# Patient Record
Sex: FEMALE | Race: WHITE | NOT HISPANIC OR LATINO | URBAN - METROPOLITAN AREA
[De-identification: names, ages, dates, MRNs, and addresses within clinical notes are randomized per-mention and may not be internally consistent; named-entity substitution may affect disease eponyms.]

---

## 2017-05-09 ENCOUNTER — INPATIENT (INPATIENT)
Facility: HOSPITAL | Age: 72
LOS: 9 days | Discharge: NURSING, REHAB OR LONG-TERM CARE | End: 2017-05-19
Attending: INTERNAL MEDICINE | Admitting: INTERNAL MEDICINE
Payer: MEDICARE

## 2017-05-09 VITALS
HEART RATE: 87 BPM | TEMPERATURE: 100 F | OXYGEN SATURATION: 96 % | RESPIRATION RATE: 20 BRPM | HEIGHT: 64 IN | SYSTOLIC BLOOD PRESSURE: 161 MMHG | WEIGHT: 149.91 LBS | DIASTOLIC BLOOD PRESSURE: 79 MMHG

## 2017-05-09 DIAGNOSIS — N18.6 END STAGE RENAL DISEASE: ICD-10-CM

## 2017-05-09 DIAGNOSIS — Z90.722 ACQUIRED ABSENCE OF OVARIES, BILATERAL: Chronic | ICD-10-CM

## 2017-05-09 DIAGNOSIS — Z89.511 ACQUIRED ABSENCE OF RIGHT LEG BELOW KNEE: Chronic | ICD-10-CM

## 2017-05-09 DIAGNOSIS — N19 UNSPECIFIED KIDNEY FAILURE: ICD-10-CM

## 2017-05-09 DIAGNOSIS — T82.868A THROMBOSIS DUE TO VASCULAR PROSTHETIC DEVICES, IMPLANTS AND GRAFTS, INITIAL ENCOUNTER: ICD-10-CM

## 2017-05-09 DIAGNOSIS — E11.9 TYPE 2 DIABETES MELLITUS WITHOUT COMPLICATIONS: ICD-10-CM

## 2017-05-09 DIAGNOSIS — Z71.89 OTHER SPECIFIED COUNSELING: ICD-10-CM

## 2017-05-09 DIAGNOSIS — I25.10 ATHEROSCLEROTIC HEART DISEASE OF NATIVE CORONARY ARTERY WITHOUT ANGINA PECTORIS: ICD-10-CM

## 2017-05-09 DIAGNOSIS — A41.9 SEPSIS, UNSPECIFIED ORGANISM: ICD-10-CM

## 2017-05-09 DIAGNOSIS — Z95.1 PRESENCE OF AORTOCORONARY BYPASS GRAFT: Chronic | ICD-10-CM

## 2017-05-09 DIAGNOSIS — Z29.9 ENCOUNTER FOR PROPHYLACTIC MEASURES, UNSPECIFIED: ICD-10-CM

## 2017-05-09 LAB
ABO RH CONFIRMATION: SIGNIFICANT CHANGE UP
ALBUMIN SERPL ELPH-MCNC: 3.2 G/DL — LOW (ref 3.3–5)
ALP SERPL-CCNC: 184 U/L — HIGH (ref 40–120)
ALT FLD-CCNC: 11 U/L — LOW (ref 12–78)
ANION GAP SERPL CALC-SCNC: 12 MMOL/L — SIGNIFICANT CHANGE UP (ref 5–17)
APPEARANCE UR: (no result)
APTT BLD: 33.5 SEC — SIGNIFICANT CHANGE UP (ref 27.5–37.4)
AST SERPL-CCNC: 20 U/L — SIGNIFICANT CHANGE UP (ref 15–37)
BACTERIA # UR AUTO: (no result)
BASOPHILS # BLD AUTO: 0.1 K/UL — SIGNIFICANT CHANGE UP (ref 0–0.2)
BASOPHILS NFR BLD AUTO: 0.9 % — SIGNIFICANT CHANGE UP (ref 0–2)
BILIRUB SERPL-MCNC: 0.4 MG/DL — SIGNIFICANT CHANGE UP (ref 0.2–1.2)
BILIRUB UR-MCNC: (no result)
BLD GP AB SCN SERPL QL: SIGNIFICANT CHANGE UP
BUN SERPL-MCNC: 78 MG/DL — HIGH (ref 7–23)
CALCIUM SERPL-MCNC: 8.6 MG/DL — SIGNIFICANT CHANGE UP (ref 8.5–10.1)
CALCIUM SERPL-MCNC: 9.5 MG/DL — SIGNIFICANT CHANGE UP (ref 8.4–10.5)
CHLORIDE SERPL-SCNC: 97 MMOL/L — SIGNIFICANT CHANGE UP (ref 96–108)
CO2 SERPL-SCNC: 26 MMOL/L — SIGNIFICANT CHANGE UP (ref 22–31)
COLOR SPEC: YELLOW — SIGNIFICANT CHANGE UP
COMMENT - URINE: SIGNIFICANT CHANGE UP
COMMENT - URINE: SIGNIFICANT CHANGE UP
CREAT SERPL-MCNC: 6.05 MG/DL — HIGH (ref 0.5–1.3)
DIFF PNL FLD: (no result)
EOSINOPHIL # BLD AUTO: 0.6 K/UL — HIGH (ref 0–0.5)
EOSINOPHIL NFR BLD AUTO: 5.6 % — SIGNIFICANT CHANGE UP (ref 0–6)
EPI CELLS # UR: (no result)
GLUCOSE SERPL-MCNC: 125 MG/DL — HIGH (ref 70–99)
GLUCOSE UR QL: NEGATIVE MG/DL — SIGNIFICANT CHANGE UP
HCT VFR BLD CALC: 34 % — LOW (ref 34.5–45)
HGB BLD-MCNC: 10.9 G/DL — LOW (ref 11.5–15.5)
INR BLD: 1.08 RATIO — SIGNIFICANT CHANGE UP (ref 0.88–1.16)
KETONES UR-MCNC: NEGATIVE — SIGNIFICANT CHANGE UP
LACTATE SERPL-SCNC: 0.9 MMOL/L — SIGNIFICANT CHANGE UP (ref 0.7–2)
LEUKOCYTE ESTERASE UR-ACNC: (no result)
LYMPHOCYTES # BLD AUTO: 0.9 K/UL — LOW (ref 1–3.3)
LYMPHOCYTES # BLD AUTO: 9.5 % — LOW (ref 13–44)
MAGNESIUM SERPL-MCNC: 2.4 MG/DL — SIGNIFICANT CHANGE UP (ref 1.8–2.4)
MANUAL DIF COMMENT BLD-IMP: SIGNIFICANT CHANGE UP
MCHC RBC-ENTMCNC: 30.9 PG — SIGNIFICANT CHANGE UP (ref 27–34)
MCHC RBC-ENTMCNC: 32.1 GM/DL — SIGNIFICANT CHANGE UP (ref 32–36)
MCV RBC AUTO: 96.2 FL — SIGNIFICANT CHANGE UP (ref 80–100)
MONOCYTES # BLD AUTO: 0.9 K/UL — SIGNIFICANT CHANGE UP (ref 0–0.9)
MONOCYTES NFR BLD AUTO: 8.7 % — SIGNIFICANT CHANGE UP (ref 2–14)
NEUTROPHILS # BLD AUTO: 7.4 K/UL — SIGNIFICANT CHANGE UP (ref 1.8–7.4)
NEUTROPHILS NFR BLD AUTO: 75.3 % — SIGNIFICANT CHANGE UP (ref 43–77)
NITRITE UR-MCNC: NEGATIVE — SIGNIFICANT CHANGE UP
PH UR: 6 — SIGNIFICANT CHANGE UP (ref 5–8)
PLAT MORPH BLD: NORMAL — SIGNIFICANT CHANGE UP
PLATELET # BLD AUTO: 160 K/UL — SIGNIFICANT CHANGE UP (ref 150–400)
POTASSIUM SERPL-MCNC: 5.7 MMOL/L — HIGH (ref 3.5–5.3)
POTASSIUM SERPL-SCNC: 5.7 MMOL/L — HIGH (ref 3.5–5.3)
PROT SERPL-MCNC: 6.7 GM/DL — SIGNIFICANT CHANGE UP (ref 6–8.3)
PROT UR-MCNC: 500 MG/DL
PROTHROM AB SERPL-ACNC: 11.7 SEC — SIGNIFICANT CHANGE UP (ref 9.8–12.7)
PTH-INTACT FLD-MCNC: 195 PG/ML — HIGH (ref 15–65)
RBC # BLD: 3.53 M/UL — LOW (ref 3.8–5.2)
RBC # FLD: 15.8 % — HIGH (ref 10.3–14.5)
RBC BLD AUTO: NORMAL — SIGNIFICANT CHANGE UP
RBC CASTS # UR COMP ASSIST: >50 /HPF (ref 0–4)
SODIUM SERPL-SCNC: 135 MMOL/L — SIGNIFICANT CHANGE UP (ref 135–145)
SP GR SPEC: 1.01 — SIGNIFICANT CHANGE UP (ref 1.01–1.02)
TYPE + AB SCN PNL BLD: SIGNIFICANT CHANGE UP
UROBILINOGEN FLD QL: NEGATIVE MG/DL — SIGNIFICANT CHANGE UP
WBC # BLD: 9.8 K/UL — SIGNIFICANT CHANGE UP (ref 3.8–10.5)
WBC # FLD AUTO: 9.8 K/UL — SIGNIFICANT CHANGE UP (ref 3.8–10.5)
WBC UR QL: (no result)

## 2017-05-09 PROCEDURE — 71010: CPT | Mod: 26

## 2017-05-09 PROCEDURE — 93010 ELECTROCARDIOGRAM REPORT: CPT

## 2017-05-09 PROCEDURE — 99283 EMERGENCY DEPT VISIT LOW MDM: CPT

## 2017-05-09 RX ORDER — SEVELAMER CARBONATE 2400 MG/1
1 POWDER, FOR SUSPENSION ORAL
Qty: 0 | Refills: 0 | COMMUNITY

## 2017-05-09 RX ORDER — FLUOROMETHOLONE 1 MG/ML
1 SOLUTION/ DROPS OPHTHALMIC
Qty: 0 | Refills: 0 | Status: DISCONTINUED | OUTPATIENT
Start: 2017-05-09 | End: 2017-05-19

## 2017-05-09 RX ORDER — CHOLECALCIFEROL (VITAMIN D3) 125 MCG
1 CAPSULE ORAL
Qty: 0 | Refills: 0 | COMMUNITY

## 2017-05-09 RX ORDER — CEFEPIME 1 G/1
1000 INJECTION, POWDER, FOR SOLUTION INTRAMUSCULAR; INTRAVENOUS DAILY
Qty: 0 | Refills: 0 | Status: COMPLETED | OUTPATIENT
Start: 2017-05-10 | End: 2017-05-10

## 2017-05-09 RX ORDER — ATORVASTATIN CALCIUM 80 MG/1
10 TABLET, FILM COATED ORAL AT BEDTIME
Qty: 0 | Refills: 0 | Status: DISCONTINUED | OUTPATIENT
Start: 2017-05-09 | End: 2017-05-19

## 2017-05-09 RX ORDER — ACETAMINOPHEN 500 MG
2 TABLET ORAL
Qty: 0 | Refills: 0 | COMMUNITY

## 2017-05-09 RX ORDER — ACETAMINOPHEN 500 MG
650 TABLET ORAL EVERY 6 HOURS
Qty: 0 | Refills: 0 | Status: DISCONTINUED | OUTPATIENT
Start: 2017-05-09 | End: 2017-05-19

## 2017-05-09 RX ORDER — METOCLOPRAMIDE HCL 10 MG
1 TABLET ORAL
Qty: 0 | Refills: 0 | COMMUNITY

## 2017-05-09 RX ORDER — SENNA PLUS 8.6 MG/1
1 TABLET ORAL
Qty: 0 | Refills: 0 | COMMUNITY

## 2017-05-09 RX ORDER — CARVEDILOL PHOSPHATE 80 MG/1
25 CAPSULE, EXTENDED RELEASE ORAL EVERY 12 HOURS
Qty: 0 | Refills: 0 | Status: DISCONTINUED | OUTPATIENT
Start: 2017-05-09 | End: 2017-05-11

## 2017-05-09 RX ORDER — AMLODIPINE BESYLATE 2.5 MG/1
5 TABLET ORAL DAILY
Qty: 0 | Refills: 0 | Status: DISCONTINUED | OUTPATIENT
Start: 2017-05-10 | End: 2017-05-11

## 2017-05-09 RX ORDER — ISOSORBIDE MONONITRATE 60 MG/1
60 TABLET, EXTENDED RELEASE ORAL DAILY
Qty: 0 | Refills: 0 | Status: DISCONTINUED | OUTPATIENT
Start: 2017-05-10 | End: 2017-05-19

## 2017-05-09 RX ORDER — ASPIRIN/CALCIUM CARB/MAGNESIUM 324 MG
81 TABLET ORAL DAILY
Qty: 0 | Refills: 0 | Status: DISCONTINUED | OUTPATIENT
Start: 2017-05-09 | End: 2017-05-19

## 2017-05-09 RX ORDER — DOCUSATE SODIUM 100 MG
100 CAPSULE ORAL THREE TIMES A DAY
Qty: 0 | Refills: 0 | Status: DISCONTINUED | OUTPATIENT
Start: 2017-05-09 | End: 2017-05-19

## 2017-05-09 RX ORDER — LACTULOSE 10 G/15ML
30 SOLUTION ORAL
Qty: 0 | Refills: 0 | COMMUNITY

## 2017-05-09 RX ORDER — CEFTRIAXONE 500 MG/1
1 INJECTION, POWDER, FOR SOLUTION INTRAMUSCULAR; INTRAVENOUS ONCE
Qty: 0 | Refills: 0 | Status: COMPLETED | OUTPATIENT
Start: 2017-05-09 | End: 2017-05-09

## 2017-05-09 RX ORDER — METOCLOPRAMIDE HCL 10 MG
10 TABLET ORAL ONCE
Qty: 0 | Refills: 0 | Status: DISCONTINUED | OUTPATIENT
Start: 2017-05-09 | End: 2017-05-19

## 2017-05-09 RX ORDER — SODIUM CHLORIDE 9 MG/ML
1000 INJECTION INTRAMUSCULAR; INTRAVENOUS; SUBCUTANEOUS ONCE
Qty: 0 | Refills: 0 | Status: COMPLETED | OUTPATIENT
Start: 2017-05-09 | End: 2017-05-09

## 2017-05-09 RX ORDER — SERTRALINE 25 MG/1
50 TABLET, FILM COATED ORAL DAILY
Qty: 0 | Refills: 0 | Status: DISCONTINUED | OUTPATIENT
Start: 2017-05-10 | End: 2017-05-19

## 2017-05-09 RX ORDER — PIPERACILLIN AND TAZOBACTAM 4; .5 G/20ML; G/20ML
3.38 INJECTION, POWDER, LYOPHILIZED, FOR SOLUTION INTRAVENOUS ONCE
Qty: 0 | Refills: 0 | Status: COMPLETED | OUTPATIENT
Start: 2017-05-09 | End: 2017-05-09

## 2017-05-09 RX ORDER — GABAPENTIN 400 MG/1
300 CAPSULE ORAL AT BEDTIME
Qty: 0 | Refills: 0 | Status: DISCONTINUED | OUTPATIENT
Start: 2017-05-10 | End: 2017-05-19

## 2017-05-09 RX ORDER — HEPARIN SODIUM 5000 [USP'U]/ML
5000 INJECTION INTRAVENOUS; SUBCUTANEOUS EVERY 8 HOURS
Qty: 0 | Refills: 0 | Status: DISCONTINUED | OUTPATIENT
Start: 2017-05-09 | End: 2017-05-19

## 2017-05-09 RX ORDER — VANCOMYCIN HCL 1 G
1000 VIAL (EA) INTRAVENOUS ONCE
Qty: 0 | Refills: 0 | Status: COMPLETED | OUTPATIENT
Start: 2017-05-09 | End: 2017-05-09

## 2017-05-09 RX ORDER — SENNA PLUS 8.6 MG/1
2 TABLET ORAL AT BEDTIME
Qty: 0 | Refills: 0 | Status: DISCONTINUED | OUTPATIENT
Start: 2017-05-09 | End: 2017-05-19

## 2017-05-09 RX ORDER — DOXERCALCIFEROL 2.5 UG/1
1 CAPSULE ORAL
Qty: 0 | Refills: 0 | Status: DISCONTINUED | OUTPATIENT
Start: 2017-05-09 | End: 2017-05-09

## 2017-05-09 RX ADMIN — CEFTRIAXONE 100 GRAM(S): 500 INJECTION, POWDER, FOR SOLUTION INTRAMUSCULAR; INTRAVENOUS at 13:45

## 2017-05-09 RX ADMIN — Medication 250 MILLIGRAM(S): at 14:00

## 2017-05-09 RX ADMIN — SODIUM CHLORIDE 1000 MILLILITER(S): 9 INJECTION INTRAMUSCULAR; INTRAVENOUS; SUBCUTANEOUS at 12:35

## 2017-05-09 RX ADMIN — PIPERACILLIN AND TAZOBACTAM 200 GRAM(S): 4; .5 INJECTION, POWDER, LYOPHILIZED, FOR SOLUTION INTRAVENOUS at 13:30

## 2017-05-09 RX ADMIN — HEPARIN SODIUM 5000 UNIT(S): 5000 INJECTION INTRAVENOUS; SUBCUTANEOUS at 22:04

## 2017-05-09 NOTE — H&P ADULT - PMH
AICD (automatic cardioverter/defibrillator) present    AVF (arteriovenous fistula)  Left  CAD (coronary artery disease)    Cervical carcinoma    CVA (cerebral vascular accident)    DM2 (diabetes mellitus, type 2)    ESRD on hemodialysis    MI (myocardial infarction)

## 2017-05-09 NOTE — CONSULT NOTE ADULT - ASSESSMENT
A/P 71 Female who is presenting with a LUE thrombosis, possible Fistula infection, and uremic    Plan:  Will Place a Right Fem Shiley  Fem Vein open on US  Not on Anticoagulants  Coags in normal range as well as platelets  Consent obtained from the patients daughter  Must be removed with in 3 Days

## 2017-05-09 NOTE — ED PROVIDER NOTE - MUSCULOSKELETAL, MLM
Spine appears normal, range of motion is not limited, no muscle or joint tenderness. LUE fistula with palpable thrill, surround edema, erythema.

## 2017-05-09 NOTE — CONSULT NOTE ADULT - SUBJECTIVE AND OBJECTIVE BOX
NEPHROLOGY CONSULT    71 yr old f h/o cardiac tumor s/p removal , CABG x 2 vessels, HD x 3 yrs, PVD, R bka 2016, AICD/PM, x 4 yrs, MI, CVA, DM.  Pt has L UE avf, same side of AICD, with clotted UE bilaterally.. Pt recently seen by her surgeon Dr awadallah who dx her w bilat UE clots, she was also seen at [facility] yesterday, I spoke to Dr hearn.. He stated he could not get a through the clots adjacent to the PM wire to de clot the vein. he suggested ligating the avf may reduce the swelling..  Today arm is erythematous and pt is febrile, lethargic. IV abx  given in ER              FAMILY HISTORY:      MEDICATIONS  (STANDING):  doxercalciferol Injectable 1MICROGram(s) IV Push <User Schedule>    MEDICATIONS  (PRN):      Allergies    No Known Allergies    Intolerances        I&O's Summary        REVIEW OF SYSTEMS:    CONSTITUTIONAL:  As per HPI.    HEENT:  Eyes:  No diplopia or blurred vision. ENT:  No earache, sore throat or runny nose.    CARDIOVASCULAR:  No pressure, squeezing, strangling, tightness, heaviness or aching about the chest, neck, axilla or epigastrium.    RESPIRATORY:  No cough, shortness of breath, PND or orthopnea.    GASTROINTESTINAL:  No nausea, vomiting or diarrhea.    GENITOURINARY:  No dysuria, frequency or urgency.    MUSCULOSKELETAL:  As per HPI.    SKIN:  No change in skin, hair or nails.    NEUROLOGIC:  No paresthesias, fasciculations, seizures or weakness.    PSYCHIATRIC:  No disorder of thought or mood.    ENDOCRINE:  No heat or cold intolerance, polyuria or polydipsia.    HEMATOLOGICAL:  No easy bruising or bleeding.    Patient was seen and evaluated on dialysis.   Patient is tolerating the procedure well.   Continue dialysis:   Dialyzer:          QB:        QD:   Goal UF ___ over ___ Hours       Vital Signs Last 24 Hrs  T(C): 38.3, Max: 38.3 (05-09 @ 12:30)  T(F): 101, Max: 101 (05-09 @ 12:30)  HR: 77 (77 - 87)  BP: 155/55 (152/59 - 167/60)  BP(mean): --  RR: 16 (14 - 20)  SpO2: 96% (94% - 100%)  Daily Height in cm: 162.56 (09 May 2017 12:24)    Daily   I&O's Summary      PHYSICAL EXAM:    General:  Alert, well-developed ,No acute distress.    Neuro:  Alert and oriented to person, place, and time. Able to communicate  well. Cranial nerves 2-12 grossly intact. 5/5 strength in all  extremities bilaterally. Sensation intact in all extremities.  Appropriate affect.     HEENT:  No JVD, no masses, Eyes anicteric, No carotid bruits.No lymphadenopathy,    Cardiovascular:  Regular rate and rhythm, with normal S1 and S2. No murmurs, rubs,  or gallops. No JVD.     Lungs:  clear. no rales, no wheezing, .    Abdomen:  Normoactive bowel sounds. Soft, flat, non-tender, and non-distended.  No hepatosplenomegaly, positive bowel sounds    Skin:  Warm, dry, well-perfused. No rashes or other lesions.     Extremities:  2+ pulses in upper and lower extremities. No lower extremity pain or  edema; legs are symmetric in appearance.    LABS:                        10.9   9.8   )-----------( 160      ( 09 May 2017 12:53 )             34.0         135  |  97  |  78<H>  ----------------------------<  125<H>  5.7<H>   |  26  |  6.05<H>    Ca    8.6      09 May 2017 12:53  Mg     2.4         TPro  6.7  /  Alb  3.2<L>  /  TBili  0.4  /  DBili  x   /  AST  20  /  ALT  11<L>  /  AlkPhos  184<H>      PT/INR - ( 09 May 2017 12:53 )   PT: 11.7 sec;   INR: 1.08 ratio         PTT - ( 09 May 2017 12:53 )  PTT:33.5 sec  Urinalysis Basic - ( 09 May 2017 12:50 )    Color: Yellow / Appearance: very cloudy / S.010 / pH: x  Gluc: x / Ketone: Negative  / Bili: Small / Urobili: Negative mg/dL   Blood: x / Protein: 500 mg/dL / Nitrite: Negative   Leuk Esterase: Moderate / RBC: >50 /HPF / WBC 11-25   Sq Epi: x / Non Sq Epi: Moderate / Bacteria: Moderate      Magnesium, Serum: 2.4 mg/dL ( @ 12:53) NEPHROLOGY CONSULT    71 yr old f h/o cardiac tumor s/p removal , CABG x 2 vessels, HD x 3 yrs, PVD, R bka 2016, AICD/PM, x 4 yrs, MI, CVA, DM.  Pt has L UE avf, same side of AICD, with clotted UE bilaterally.. Pt recently seen by her surgeon Dr awadallah who dx her w bilat UE clots, she was also seen at [facility] yesterday, I spoke to Dr hearn.. He stated he could not get a through the clots adjacent to the PM wire to de clot the vein. he suggested ligating the avf may reduce the swelling..  Today arm is erythematous and pt is febrile, lethargic. IV abx  given in ER ( zosyn, rocephin, vanco)    PSH  heart surgery for clot removal  CABG  R BKA  AV access  bilat oophorectomy and NANCY for cervical CA 1970s    FAMILY HISTORY:  Not contributory    Social:   2 daughters  ex smoker  no alcohol      MEDICATIONS  (STANDING):  Cefepime 1 g IVPB qd          Allergies    No Known Allergies    Intolerances        I&O's Summary        REVIEW OF SYSTEMS:    CONSTITUTIONAL:  As per HPI.    Lethargic due to sepsis      Vital Signs Last 24 Hrs  T(C): 38.3, Max: 38.3 (-09 @ 12:30)  T(F): 101, Max: 101 (05-09 @ 12:30)  HR: 77 (77 - 87)  BP: 155/55 (152/59 - 167/60)  BP(mean): --  RR: 16 (14 - 20)  SpO2: 96% (94% - 100%)  Daily Height in cm: 162.56 (09 May 2017 12:24)    Daily   I&O's Summary      PHYSICAL EXAM:    General:  Alert, well-developed ,No acute distress.    Neuro:  lethargic    HEENT:  No JVD, no masses, Eyes anicteric, No carotid bruits.No lymphadenopathy,    Cardiovascular:  RR 2/6 m, no rubs    Lungs:  clear. no rales, no wheezing, .    Abdomen:  Normoactive bowel sounds. Soft, flat, non-tender, and non-distended.  No hepatosplenomegaly, positive bowel sounds    Skin:  Warm, dry, well-perfused. No rashes or other lesions. Left arm, skin erythematous    Extremities:  R bka, Left arm swollen, erythematous    LABS:                        10.9   9.8   )-----------( 160      ( 09 May 2017 12:53 )             34.0         135  |  97  |  78<H>  ----------------------------<  125<H>  5.7<H>   |  26  |  6.05<H>    Ca    8.6      09 May 2017 12:53  Mg     2.4         TPro  6.7  /  Alb  3.2<L>  /  TBili  0.4  /  DBili  x   /  AST  20  /  ALT  11<L>  /  AlkPhos  184<H>      PT/INR - ( 09 May 2017 12:53 )   PT: 11.7 sec;   INR: 1.08 ratio         PTT - ( 09 May 2017 12:53 )  PTT:33.5 sec  Urinalysis Basic - ( 09 May 2017 12:50 )    Color: Yellow / Appearance: very cloudy / S.010 / pH: x  Gluc: x / Ketone: Negative  / Bili: Small / Urobili: Negative mg/dL   Blood: x / Protein: 500 mg/dL / Nitrite: Negative   Leuk Esterase: Moderate / RBC: >50 /HPF / WBC 11-25   Sq Epi: x / Non Sq Epi: Moderate / Bacteria: Moderate      Magnesium, Serum: 2.4 mg/dL ( @ 12:53)

## 2017-05-09 NOTE — H&P ADULT - PROBLEM SELECTOR PLAN 3
~plan is for emergent dialysis in the CCU with plans for possible repeat session in the  am.  ~f/u w/ Renal  ~cont. Renvela when tolerated

## 2017-05-09 NOTE — H&P ADULT - PROBLEM SELECTOR PLAN 6
~Vte ppx; cont. Heparin sq ~Per patients' daughter and documentation review from [facility] the patient is DNR. The patients' HCP is the patients'  Darrel Aguilar. Please confirm in the am as efforts to contact him were unsuccessful.  ~f/u w/ Palliative care

## 2017-05-09 NOTE — ED PROVIDER NOTE - OBJECTIVE STATEMENT
70 y/o F with a PMHx of R BKA, CKD on dialysis presents to the ED c/o fever, AMS, inability to access dialysis fistula as per EMS who brought her in from [facility] where she was being dialyzed. Pt here to be admitted due to dialysis complications. Pt currently somnolent, tired appearing, able to open eyes upon command, but unable to give adequate hx or ROS at this time. (Code Sepsis) 72 y/o F with a PMHx of R BKA, CKD on dialysis presents to the ED c/o fever, AMS, inability to access dialysis fistula as per EMS who brought her in from [facility] where she was being dialyzed. Pt here to be admitted due to dialysis complications. Pt currently somnolent, tired appearing, able to open eyes upon command, but unable to give adequate hx or ROS at this time. (Code Sepsis) 70 y/o F with a PMHx of R BKA, LUE DVT, CKD on dialysis presents to the ED c/o fever, AMS, inability to access dialysis fistula as per EMS who brought her in from [facility] where she was being dialyzed. Pt here to be admitted due to dialysis complications. Pt currently somnolent, tired appearing, able to open eyes upon command, but unable to give adequate hx or ROS at this time.

## 2017-05-09 NOTE — H&P ADULT - PROBLEM SELECTOR PLAN 1
~due to likely infected Lt AVF with surrounding Cellulitis  ~f/u PAN C+S  ~cont. Cefepime 1g IV on dialysis days  ~f/u w/ ID in the am  ~patient admitted to CCU  ~Vancomycin given

## 2017-05-09 NOTE — CONSULT NOTE ADULT - CONSULT REASON
ESRD sepsis, clotted access  71 yr old f h/o cardiac tumor s/p removal 1990s, CABG x 2 vessels, HD x 3 yrs, PVD, R bka 9/2016, AICD/PM, x 4 yrs, MI, CVA, DM.  Pt has L UE avf, same side of AICD, with clotted UE bilaterally.. Pt recently seen by her surgeon Dr awadallah who dx her w bilat UE clots, she was also seen ESRD sepsis, clotted access

## 2017-05-09 NOTE — CONSULT NOTE ADULT - SUBJECTIVE AND OBJECTIVE BOX
Pt is a 70 y/o F with pmhx of cardiac tumor s/p removal , CABG x 2 vessels, HD for the past 3 years, PVD, R bka 2016, AICD/PM 4 years ago, MI, CVA, DM.  Called to ED to see pt by Dr. Leal for need for access for emergent dialysis as pt previously has LUE avf, same side of AICD, which has occluded with thrombus. Pt fpund to have b/l thrombus and has not had access for dialysis. Pt has not been dialized since for the past 4 days. Pt recently seen by her surgeon Dr Awadallah who dx her w bilat UE clots, she was also seen at [facility] yesterday, who spoke with Dr. Leal and noted that he could not get a through the clots adjacent to the PM wire to de clot the vein. he suggested ligating the avf which may reduce the swelling. Today pt arrived from [facility] with fever, erythematous arm, and lethargic. In ED pt was started on zpsyn, rocephin and vanco. Potassium noted to be 5.7. Pt obtunded.      D/W pts daughter, will place femoral line and dialyze today  ID consult, Dr Carrasco informed, suggested to place on Cefepime 1 g iv daily starting in am, after hd on hd days..  Dr Morin consulted for second opinion of av access..  lactate level w HD      ICU Vital Signs Last 24 Hrs  T(C): 36.7, Max: 38.3 (05-09 @ 12:30)  T(F): 98.1, Max: 101 (05-09 @ 12:30)  HR: 75 (74 - 87)  BP: 161/50 (152/59 - 167/60)  BP(mean): 80 (80 - 80)  ABP: --  ABP(mean): --  RR: 19 (14 - 20)  SpO2: 93% (93% - 100%)    T(C): 36.7, Max: 38.3 (05-09 @ 12:30)  HR: 75 (74 - 87)  BP: 161/50 (152/59 - 167/60)  RR: 19 (14 - 20)  SpO2: 93% (93% - 100%)  Wt(kg): --    PE:  Gen: Obtunded lethargic but still arousabile   CV: s1s2 rrr  Lungs: CTA b/l  Abd: soft NTD  Neck Right sided scar s/p enderactomy  Ext LUE erythematous, edematous with calor , right BKA 1+ edema                              10.9   9.8   )-----------( 160      ( 09 May 2017 12:53 )             34.0     09 May 2017 12:53    135    |  97     |  78     ----------------------------<  125    5.7     |  26     |  6.05     Ca    8.6        09 May 2017 12:53  Mg     2.4       09 May 2017 12:53    TPro  6.7    /  Alb  3.2    /  TBili  0.4    /  DBili  x      /  AST  20     /  ALT  11     /  AlkPhos  184    09 May 2017 12:53    PT/INR - ( 09 May 2017 12:53 )   PT: 11.7 sec;   INR: 1.08 ratio         PTT - ( 09 May 2017 12:53 )  PTT:33.5 sec  CAPILLARY BLOOD GLUCOSE    LIVER FUNCTIONS - ( 09 May 2017 12:53 )  Alb: 3.2 g/dL / Pro: 6.7 gm/dL / ALK PHOS: 184 U/L / ALT: 11 U/L / AST: 20 U/L / GGT: x           Urinalysis Basic - ( 09 May 2017 12:50 )    Color: Yellow / Appearance: very cloudy / S.010 / pH: x  Gluc: x / Ketone: Negative  / Bili: Small / Urobili: Negative mg/dL   Blood: x / Protein: 500 mg/dL / Nitrite: Negative   Leuk Esterase: Moderate / RBC: >50 /HPF / WBC 11-25   Sq Epi: x / Non Sq Epi: Moderate / Bacteria: Moderate      CXR IMPRESSION:     There is right perihilar prominence and pulmonary vascular engorgement   suggestive of interstitial edema. There is probable small left effusion.   There is cardiomegaly. Status post sternotomy. Pacemaker/ICD tip in   region of right ventricle.           A&P Pt is a 70 y/o F with pmhx of cardiac tumor s/p removal , CABG x 2 vessels, HD for the past 3 years, PVD, R bka 2016, AICD/PM 4 years ago, MI, CVA, DM. Admitted for emergent dialysis s/p b/l UE thrombus limiting access for dialysis.     Will place Femoral shiley catheter and subsequent emergent dialysis with Dr. Leal  Will monitor and will be admitted under hospitalist service s/p dialysis.  Shiley temporary measure to be removed by day 3 at the latest. Pt will need new access.    Pt with possible line sepsis due to previous line As per ID pt to start on Cefepime 1 G daily to be given with after dialysis on HD days.  Dr. Morni consult in for 2nd opinion on access.     D/W Dr. Alas.

## 2017-05-09 NOTE — INPATIENT CERTIFICATION FOR MEDICARE PATIENTS - RISKS OF ADVERSE EVENTS
Concern for worsening infectious process/Concern for renal deterioration/Concern for delay in diagnosis and treatment

## 2017-05-09 NOTE — H&P ADULT - ASSESSMENT
72 y/o F PMHx significant for severe PVD s/p Rt BKA (9/2016), LUE DVT, cardiac tumor s/p removal 1990s, CABG x 2 vessels, ESRD on HD x 3 yrs, AICD/PM, CAD, prior MI, CVA, DM2 who was BIBA from her dialysis center where an attempt to dialyze via LUE AVF was unsuccessful as she it was found to be clotted. The patient was referred to the ED for further management as the patients' last dialysis was this past Saturday. Per Renal the patient was recently evaluated by Dr. Awadallah who diagnosed her w/ B/L UE. The patient was evaluated at [facility] yesterday where they were unsuccessful. Today the patient was febrile (Tmax 101'F), and her left arm was increasingly erythematous. In the ED the patient was given Ceftriaxone 1g IV x 1, Zosyn 3.375g IV x 1, and Vancomycin 1g IV x 1. The patient was admitted to the CCU for emergent dialysis and shiley catheter placement. Renal was consulted in the ED. 70 y/o F PMHx significant for severe PVD s/p Rt BKA (9/2016), LUE DVT, cardiac tumor s/p removal 1990s, CABG x 2 vessels, ESRD on HD x 3 yrs, AICD/PM, CAD, prior MI, CVA, DM2 who was BIBA from [facility] where she was found to be increasingly lethargic, febrile, with left arm swelling and erythema. The patient was recently evaluated by her vascular surgeon Dr. Awadallah who diagnosed her w/ B/L UE thrombi (of note the patients' last HD session was on Saturday). The patient was evaluated yesterday at [facility]. Per Renal attempt at AVF salvage was unsuccessful. Today the patient was referred to the ED from [facility] as the patient was noted to be increasingly lethargic, febrile, and had left arm swelling and erythema. In the ED the patient was given Ceftriaxone 1g IV x 1, Zosyn 3.375g IV x 1, and Vancomycin 1g IV x 1. The patient was admitted to the CCU for emergent dialysis and shiley catheter placement. Renal, Vascular Surgery, and ID were consulted in the ED.

## 2017-05-09 NOTE — CONSULT NOTE ADULT - SUBJECTIVE AND OBJECTIVE BOX
Consult Note:   · Provider Specialty	Nephrology	    Referral/Consultation:   Initial Consult:  · Requested by Name:	Hospitalist	  · Date/Time:	09-May-2017 15:49	  · Reason for Referral/Consultation:	ESRD sepsis, clotted access	  	ESRD sepsis, clotted access 71 yr old f h/o cardiac tumor s/p removal , CABG x 2 vessels, HD x 3 yrs, PVD, R bka 2016, AICD/PM, x 4 yrs, MI, CVA, DM. Pt has L UE avf, same side of AICD, with clotted UE bilaterally.. Pt recently seen by her surgeon Dr awadallah who dx her w bilat UE clots, she was also seen	      · Subjective and Objective: 	  VASCULAR CONSULT    71 yr old f h/o cardiac tumor s/p removal , CABG x 2 vessels, HD x 3 yrs, PVD, R bka 2016, L AICD/PM, x 4 yrs, MI, CVA, DM.  Pt has L UE avf, same side of AICD, with clotted UE bilaterally.. Pt recently seen by her surgeon Dr Awadallah who dx her w bilat UE clots, she was also seen at [facility] yesterday, Dr White stated he could not get a through the clots adjacent to the pacing wire to de clot the vein and that ligating the avf may reduce the swelling..  Today arm is erythematous and pt is febrile, lethargic. IV abx  given in ER ( zosyn, rocephin, vanco)    [facility]  heart surgery for clot removal  CABG  R AKA  AV access  bilat oophorectomy and NANCY for cervical CA 1970s    FAMILY HISTORY:  Not contributory    Social:   2 daughters  ex smoker  no alcohol      MEDICATIONS  (STANDING):  Cefepime 1 g IVPB qd          Allergies    No Known Allergies    Intolerances        I&O's Summary        REVIEW OF SYSTEMS:    CONSTITUTIONAL:  As per HPI.    Lethargic due to sepsis      Vital Signs Last 24 Hrs  T(C): 38.3, Max: 38.3 (05-09 @ 12:30)  T(F): 101, Max: 101 (05- @ 12:30)  HR: 77 (77 - 87)  BP: 155/55 (152/59 - 167/60)  BP(mean): --  RR: 16 (14 - 20)  SpO2: 96% (94% - 100%)  Daily Height in cm: 162.56 (09 May 2017 12:24)    Daily   I&O's Summary      PHYSICAL EXAM:    General:  Alert, well-developed ,No acute distress.    Neuro:  lethargic    HEENT:  No JVD, no masses, Eyes anicteric, No carotid bruits.No lymphadenopathy,    Cardiovascular:  RR 2/6 m, no rubs    Lungs:  clear. no rales, no wheezing, PPM pocket L upper outer chest wall    Abdomen:  Normoactive bowel sounds. Soft, flat, non-tender, and non-distended.  No hepatosplenomegaly, positive bowel sounds    Skin:  Warm, dry, well-perfused. No rashes or other lesions. Left arm, skin erythematous    Extremities:  R  AKA, R femoral dialysis catheter in place  Left arm edematous, erythematous; audible bruit, pulsatile thrill in antecubital fossa; puncture site L access without gross purulence, L hand pink, warm and well perfused    LABS:                        10.9   9.8   )-----------( 160      ( 09 May 2017 12:53 )             34.0     05    135  |  97  |  78<H>  ----------------------------<  125<H>  5.7<H>   |  26  |  6.05<H>    Ca    8.6      09 May 2017 12:53  Mg     2.4         TPro  6.7  /  Alb  3.2<L>  /  TBili  0.4  /  DBili  x   /  AST  20  /  ALT  11<L>  /  AlkPhos  184<H>      PT/INR - ( 09 May 2017 12:53 )   PT: 11.7 sec;   INR: 1.08 ratio         PTT - ( 09 May 2017 12:53 )  PTT:33.5 sec  Urinalysis Basic - ( 09 May 2017 12:50 )    Color: Yellow / Appearance: very cloudy / S.010 / pH: x  Gluc: x / Ketone: Negative  / Bili: Small / Urobili: Negative mg/dL   Blood: x / Protein: 500 mg/dL / Nitrite: Negative   Leuk Esterase: Moderate / RBC: >50 /HPF / WBC 11-25   Sq Epi: x / Non Sq Epi: Moderate / Bacteria: Moderate      Magnesium, Serum: 2.4 mg/dL ( @ 12:53)        NEPHROLOGY CONSULT    71 yr old f h/o cardiac tumor s/p removal , CABG x 2 vessels, HD x 3 yrs, PVD, R bka 2016, AICD/PM, x 4 yrs, MI, CVA, DM.  Pt has L UE avf, same side of AICD, with clotted UE bilaterally.. Pt recently seen by her surgeon Dr awadallah who dx her w bilat UE clots, she was also seen at [facility] yesterday, I spoke to Dr white.. He stated he could not get a through the clots adjacent to the PM wire to de clot the vein. he suggested ligating the avf may reduce the swelling..  Today arm is erythematous and pt is febrile, lethargic. IV abx  given in ER              FAMILY HISTORY:      MEDICATIONS  (STANDING):  doxercalciferol Injectable 1MICROGram(s) IV Push <User Schedule>    MEDICATIONS  (PRN):      Allergies    No Known Allergies    Intolerances        I&O's Summary        REVIEW OF SYSTEMS:    CONSTITUTIONAL:  As per HPI.    HEENT:  Eyes:  No diplopia or blurred vision. ENT:  No earache, sore throat or runny nose.    CARDIOVASCULAR:  No pressure, squeezing, strangling, tightness, heaviness or aching about the chest, neck, axilla or epigastrium.    RESPIRATORY:  No cough, shortness of breath, PND or orthopnea.    GASTROINTESTINAL:  No nausea, vomiting or diarrhea.    GENITOURINARY:  No dysuria, frequency or urgency.    MUSCULOSKELETAL:  As per HPI.    SKIN:  No change in skin, hair or nails.    NEUROLOGIC:  No paresthesias, fasciculations, seizures or weakness.    PSYCHIATRIC:  No disorder of thought or mood.    ENDOCRINE:  No heat or cold intolerance, polyuria or polydipsia.    HEMATOLOGICAL:  No easy bruising or bleeding.    Patient was seen and evaluated on dialysis.   Patient is tolerating the procedure well.   Continue dialysis:   Dialyzer:          QB:        QD:   Goal UF ___ over ___ Hours       Vital Signs Last 24 Hrs  T(C): 38.3, Max: 38.3 (- @ 12:30)  T(F): 101, Max: 101 (- @ 12:30)  HR: 77 (77 - 87)  BP: 155/55 (152/59 - 167/60)  BP(mean): --  RR: 16 (14 - 20)  SpO2: 96% (94% - 100%)  Daily Height in cm: 162.56 (09 May 2017 12:24)    Daily   I&O's Summary      PHYSICAL EXAM:    General:  Alert, well-developed ,No acute distress.    Neuro:  Alert and oriented to person, place, and time. Able to communicate  well. Cranial nerves 2-12 grossly intact. 5/5 strength in all  extremities bilaterally. Sensation intact in all extremities.  Appropriate affect.     HEENT:  No JVD, no masses, Eyes anicteric, No carotid bruits.No lymphadenopathy,    Cardiovascular:  Regular rate and rhythm, with normal S1 and S2. No murmurs, rubs,  or gallops. No JVD.     Lungs:  clear. no rales, no wheezing, .    Abdomen:  Normoactive bowel sounds. Soft, flat, non-tender, and non-distended.  No hepatosplenomegaly, positive bowel sounds    Skin:  Warm, dry, well-perfused. No rashes or other lesions.     Extremities:  2+ pulses in upper and lower extremities. No lower extremity pain or  edema; legs are symmetric in appearance.    LABS:                        10.9   9.8   )-----------( 160      ( 09 May 2017 12:53 )             34.0         135  |  97  |  78<H>  ----------------------------<  125<H>  5.7<H>   |  26  |  6.05<H>    Ca    8.6      09 May 2017 12:53  Mg     2.4         TPro  6.7  /  Alb  3.2<L>  /  TBili  0.4  /  DBili  x   /  AST  20  /  ALT  11<L>  /  AlkPhos  184<H>      PT/INR - ( 09 May 2017 12:53 )   PT: 11.7 sec;   INR: 1.08 ratio         PTT - ( 09 May 2017 12:53 )  PTT:33.5 sec  Urinalysis Basic - ( 09 May 2017 12:50 )    Color: Yellow / Appearance: very cloudy / S.010 / pH: x  Gluc: x / Ketone: Negative  / Bili: Small / Urobili: Negative mg/dL   Blood: x / Protein: 500 mg/dL / Nitrite: Negative   Leuk Esterase: Moderate / RBC: >50 /HPF / WBC 11-25   Sq Epi: x / Non Sq Epi: Moderate / Bacteria: Moderate      Magnesium, Serum: 2.4 mg/dL ( @ 12:53)    Assessment and Recommendation:   · Assessment		  71 yr old f h/o cardiac tumor s/p removal , CABG x 2 vessels, HD x 3 yrs, PVD, R bka 2016, AICD/PM, x 4 yrs, MI, CVA, DM.  Pt has L UE avf, same side of AICD, with clotted UE bilaterally.. Pt recently seen by her surgeon Dr awadallah who dx her w bilat UE clots, she was also seen at [facility] yesterday,   Arm is erythematous and pt is febrile, lethargic. IV abx  given in ER ( zosyn, rocephin, vanco)    no indication for urgent or emergent ligation although I agree that it would more than likely reduce arm edema (L)    D/W pts daughter, will place femoral line and dialyze today  ID consult, Dr Carrasco informed, suggested to place on Cefepime 1 g iv daily starting in am, after hd on hd days..  lactate level w HD        Electronic Signatures:  Ricardo Leal)  (Signed 09-May-2017 17:02)  	Authored: Consult Note, Referral/Consultation, Subjective and Objective, Assessment and Recommendation      Last Updated: 09-May-2017 17:02 by Ricardo Leal ()

## 2017-05-09 NOTE — ED PROVIDER NOTE - PROGRESS NOTE DETAILS
Pt rectal 101F here in ED. Pt rectal 101F here in ED. 30cc/kg bolus held secondary to pt hx of renal disease.

## 2017-05-09 NOTE — H&P ADULT - NSHPPHYSICALEXAM_GEN_ALL_CORE
Vital Signs Last 24 Hrs  T(C): 37.3, Max: 38.3 (05-09 @ 12:30)  T(F): 99.1, Max: 101 (05-09 @ 12:30)  HR: 75 (74 - 87)  BP: 161/50 (152/59 - 167/60)  BP(mean): 80 (80 - 80)  RR: 19 (14 - 20)  SpO2: 93% (93% - 100%)

## 2017-05-09 NOTE — PATIENT PROFILE ADULT. - FUNCTIONAL LEVEL PRIOR: COMMUNICATION
confused and disoriented at times./(2) difficulty understanding and speaking (not related to language barrier)

## 2017-05-09 NOTE — H&P ADULT - PSH
H/O bilateral oophorectomy    S/P BKA (below knee amputation) unilateral, right    S/P CABG (coronary artery bypass graft)

## 2017-05-09 NOTE — PROCEDURE NOTE - NSPROCDETAILS_GEN_ALL_CORE
ultrasound guidance/guidewire recovered/lumen(s) aspirated and flushed/sterile technique, catheter placed/sterile dressing applied

## 2017-05-09 NOTE — H&P ADULT - HISTORY OF PRESENT ILLNESS
72 y/o F PMHx significant for severe PVD s/p Rt BKA (9/2016), LUE DVT, cardiac tumor s/p removal 1990s, CABG x 2 vessels, ESRD on HD x 3 yrs, AICD/PM, CAD, prior MI, CVA, DM2 who was BIBA from her dialysis center where an attempt to dialyze via LUE AVF was unsuccessful as she it was found to be clotted. The patient was referred to the ED for further management as the patients' last dialysis was this past Saturday. Per Renal the patient was recently evaluated by Dr. Awadallah who diagnosed her w/ B/L UE. The patient was evaluated at [facility] yesterday where they were unsuccessful. Today the patient was febrile (Tmax 101'F), and her left arm was increasingly erythematous. In the ED the patient was given Ceftriaxone 1g IV x 1, Zosyn 3.375g IV x 1, and Vancomycin 1g IV x 1. The patient was admitted to the CCU for emergent dialysis and shiley catheter placement. Renal was consulted in the ED. 70 y/o F PMHx significant for severe PVD s/p Rt BKA (9/2016), LUE DVT, cardiac tumor s/p removal 1990s, CABG x 2 vessels, ESRD on HD x 3 yrs, AICD/PM, CAD, prior MI, CVA, DM2 who was BIBA from [facility] where she was found to be increasingly lethargic, febrile, with left arm swelling and erythema. The patient was recently evaluated by her vascular surgeon Dr. Awadallah who diagnosed her w/ B/L UE thrombi (of note the patients' last HD session was on Saturday). The patient was evaluated yesterday at [facility]. Per Renal attempt at AVF salvage was unsuccessful. Today the patient was referred to the ED from [facility] as the patient was noted to be increasingly lethargic, febrile, and had left arm swelling and erythema. In the ED the patient was given Ceftriaxone 1g IV x 1, Zosyn 3.375g IV x 1, and Vancomycin 1g IV x 1. The patient was admitted to the CCU for emergent dialysis and shiley catheter placement. Renal, Vascular Surgery, and ID were consulted in the ED. 72 y/o F PMHx significant for severe PVD s/p Rt BKA (9/2016), LUE DVT, cardiac tumor s/p removal 1990s, CABG x 2 vessels, ESRD on HD x 3 yrs, AICD/PM, CAD, prior MI, CVA, DM2 who was BIBA from [facility] where she was found to be increasingly lethargic, febrile, with left arm swelling and erythema. The patient was recently evaluated by her vascular surgeon Dr. Awadallah who diagnosed her w/ B/L UE thrombi on 5/4/2017 (of note the patients' last HD session was on Saturday). The patient was evaluated yesterday at [facility]. Per Renal attempt at AVF salvage was unsuccessful. Today the patient was referred to the ED from [facility] as the patient was noted to be increasingly lethargic, febrile, and had left arm swelling and erythema. In the ED the patient was given Ceftriaxone 1g IV x 1, Zosyn 3.375g IV x 1, and Vancomycin 1g IV x 1. The patient was admitted to the CCU for emergent dialysis and shiley catheter placement. Renal, Vascular Surgery, and ID were consulted in the ED.

## 2017-05-09 NOTE — CONSULT NOTE ADULT - ASSESSMENT
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  Pt has L UE avf, same side of AICD, with clotted UE bilaterally.. Pt recently seen by her surgeon Dr awadallah who dx her w bilat UE clots, she was also seen at [facility] yesterday, I spoke to Dr hearn.. He stated he could not get a through the clots adjacent to the PM wire to de clot the vein. he suggested ligating the avf may reduce the swelling..  Today arm is erythematous and pt is febrile, lethargic. IV abx  given in ER ( zosyn, rocephin, vanco)    D/W pts daughter, will place femoral line and dialyze today  ID consult, Dr Carrasco informed, suggested to place on Cefepime 1 g iv daily starting in am, after hd on hd days..  Dr Morin consulted for second opinion of av access..  lactate level w HD

## 2017-05-09 NOTE — ED ADULT NURSE NOTE - OBJECTIVE STATEMENT
Pt is a 71y female, lethargic, confused, presents to ED from MD's office for fever, pt needs dialysis has fistula in LUE, pt alert to verbal stimuli, denies chest pain, SOB, + non-productive cough, has BKA right leg, is still able to produce urine, IV lock established, lab work collected, IV fluids initiated slowly, pt placed on cardiac monitor, EKG obtained, bed rails up, safety maintained. Pt is a 71y female, lethargic, confused, presents to ED from MD's office for fever, pt needs dialysis has fistula in LUE, pt alert to verbal stimuli, denies chest pain, SOB, + non-productive cough, has BKA right leg, is still able to produce urine, pt has left arm swelling, IV lock established, lab work collected, IV fluids initiated slowly, pt placed on cardiac monitor, EKG obtained, bed rails up, safety maintained.

## 2017-05-09 NOTE — ED PROVIDER NOTE - DETAILS:
I, Mary Anne Porras,, performed the initial face to face bedside interview with this patient regarding history of present illness, review of symptoms and relevant past medical, social and family history.  I completed an independent physical examination.  I was the initial provider who evaluated this patient.  The history, relevant review of systems, past medical and surgical history, medical decision making, and physical examination was documented by the scribe in my presence and I attest to the accuracy of the documentation.

## 2017-05-09 NOTE — ED PROVIDER NOTE - MEDICAL DECISION MAKING DETAILS
Pt currently somnolent, drowsy, able to follow simple commands with plans to receive EKG, rectal exam, labs, CBC for further eval and tx.

## 2017-05-09 NOTE — H&P ADULT - PROBLEM SELECTOR PLAN 2
~pt is s/p right femoral shiley catheter placement  by [facility]  ~f/u w/ Vascular Surgery

## 2017-05-09 NOTE — CONSULT NOTE ADULT - ASSESSMENT
IV antibiotics, L arm elevation, dialysis via newly placed R femoral catheter    would duplex IJ bilaterally but even if they were patent, central venous anatomy may be occluded making placement of a dialysis catheter impossible

## 2017-05-10 LAB
ALBUMIN SERPL ELPH-MCNC: 3.3 G/DL — SIGNIFICANT CHANGE UP (ref 3.3–5)
ALP SERPL-CCNC: 172 U/L — HIGH (ref 40–120)
ALT FLD-CCNC: 12 U/L — SIGNIFICANT CHANGE UP (ref 12–78)
ANION GAP SERPL CALC-SCNC: 10 MMOL/L — SIGNIFICANT CHANGE UP (ref 5–17)
AST SERPL-CCNC: 16 U/L — SIGNIFICANT CHANGE UP (ref 15–37)
BASOPHILS # BLD AUTO: 0.1 K/UL — SIGNIFICANT CHANGE UP (ref 0–0.2)
BASOPHILS NFR BLD AUTO: 1.2 % — SIGNIFICANT CHANGE UP (ref 0–2)
BILIRUB SERPL-MCNC: 0.4 MG/DL — SIGNIFICANT CHANGE UP (ref 0.2–1.2)
BUN SERPL-MCNC: 45 MG/DL — HIGH (ref 7–23)
CALCIUM SERPL-MCNC: 8.8 MG/DL — SIGNIFICANT CHANGE UP (ref 8.5–10.1)
CHLORIDE SERPL-SCNC: 100 MMOL/L — SIGNIFICANT CHANGE UP (ref 96–108)
CO2 SERPL-SCNC: 27 MMOL/L — SIGNIFICANT CHANGE UP (ref 22–31)
CREAT SERPL-MCNC: 4.43 MG/DL — HIGH (ref 0.5–1.3)
CULTURE RESULTS: SIGNIFICANT CHANGE UP
EOSINOPHIL # BLD AUTO: 0.2 K/UL — SIGNIFICANT CHANGE UP (ref 0–0.5)
EOSINOPHIL NFR BLD AUTO: 3 % — SIGNIFICANT CHANGE UP (ref 0–6)
GLUCOSE SERPL-MCNC: 146 MG/DL — HIGH (ref 70–99)
HAV IGM SER-ACNC: SIGNIFICANT CHANGE UP
HBV CORE IGM SER-ACNC: SIGNIFICANT CHANGE UP
HBV SURFACE AG SER-ACNC: SIGNIFICANT CHANGE UP
HCT VFR BLD CALC: 34.7 % — SIGNIFICANT CHANGE UP (ref 34.5–45)
HCV AB S/CO SERPL IA: 0.28 S/CO — SIGNIFICANT CHANGE UP
HCV AB SERPL-IMP: SIGNIFICANT CHANGE UP
HGB BLD-MCNC: 11.2 G/DL — LOW (ref 11.5–15.5)
LACTATE SERPL-SCNC: 1 MMOL/L — SIGNIFICANT CHANGE UP (ref 0.7–2)
LYMPHOCYTES # BLD AUTO: 0.6 K/UL — LOW (ref 1–3.3)
LYMPHOCYTES # BLD AUTO: 9.3 % — LOW (ref 13–44)
MAGNESIUM SERPL-MCNC: 2.2 MG/DL — SIGNIFICANT CHANGE UP (ref 1.8–2.4)
MCHC RBC-ENTMCNC: 31.7 PG — SIGNIFICANT CHANGE UP (ref 27–34)
MCHC RBC-ENTMCNC: 32.2 GM/DL — SIGNIFICANT CHANGE UP (ref 32–36)
MCV RBC AUTO: 98.5 FL — SIGNIFICANT CHANGE UP (ref 80–100)
MONOCYTES # BLD AUTO: 0.5 K/UL — SIGNIFICANT CHANGE UP (ref 0–0.9)
MONOCYTES NFR BLD AUTO: 7.7 % — SIGNIFICANT CHANGE UP (ref 2–14)
NEUTROPHILS # BLD AUTO: 4.7 K/UL — SIGNIFICANT CHANGE UP (ref 1.8–7.4)
NEUTROPHILS NFR BLD AUTO: 78.7 % — HIGH (ref 43–77)
PHOSPHATE SERPL-MCNC: 3.2 MG/DL — SIGNIFICANT CHANGE UP (ref 2.5–4.5)
PLATELET # BLD AUTO: 135 K/UL — LOW (ref 150–400)
POTASSIUM SERPL-MCNC: 4.3 MMOL/L — SIGNIFICANT CHANGE UP (ref 3.5–5.3)
POTASSIUM SERPL-SCNC: 4.3 MMOL/L — SIGNIFICANT CHANGE UP (ref 3.5–5.3)
PROT SERPL-MCNC: 7 GM/DL — SIGNIFICANT CHANGE UP (ref 6–8.3)
RBC # BLD: 3.52 M/UL — LOW (ref 3.8–5.2)
RBC # FLD: 15.6 % — HIGH (ref 10.3–14.5)
SODIUM SERPL-SCNC: 137 MMOL/L — SIGNIFICANT CHANGE UP (ref 135–145)
SPECIMEN SOURCE: SIGNIFICANT CHANGE UP
TSH SERPL-MCNC: 1.85 UIU/ML — SIGNIFICANT CHANGE UP (ref 0.36–3.74)
WBC # BLD: 6 K/UL — SIGNIFICANT CHANGE UP (ref 3.8–10.5)
WBC # FLD AUTO: 6 K/UL — SIGNIFICANT CHANGE UP (ref 3.8–10.5)

## 2017-05-10 PROCEDURE — 93970 EXTREMITY STUDY: CPT | Mod: 26

## 2017-05-10 RX ADMIN — GABAPENTIN 300 MILLIGRAM(S): 400 CAPSULE ORAL at 22:46

## 2017-05-10 RX ADMIN — HEPARIN SODIUM 5000 UNIT(S): 5000 INJECTION INTRAVENOUS; SUBCUTANEOUS at 13:25

## 2017-05-10 RX ADMIN — HEPARIN SODIUM 5000 UNIT(S): 5000 INJECTION INTRAVENOUS; SUBCUTANEOUS at 05:35

## 2017-05-10 RX ADMIN — AMLODIPINE BESYLATE 5 MILLIGRAM(S): 2.5 TABLET ORAL at 11:35

## 2017-05-10 RX ADMIN — Medication 81 MILLIGRAM(S): at 10:22

## 2017-05-10 RX ADMIN — FLUOROMETHOLONE 1 DROP(S): 1 SOLUTION/ DROPS OPHTHALMIC at 18:10

## 2017-05-10 RX ADMIN — FLUOROMETHOLONE 1 DROP(S): 1 SOLUTION/ DROPS OPHTHALMIC at 13:25

## 2017-05-10 RX ADMIN — HEPARIN SODIUM 5000 UNIT(S): 5000 INJECTION INTRAVENOUS; SUBCUTANEOUS at 22:46

## 2017-05-10 RX ADMIN — ISOSORBIDE MONONITRATE 60 MILLIGRAM(S): 60 TABLET, EXTENDED RELEASE ORAL at 10:22

## 2017-05-10 RX ADMIN — CARVEDILOL PHOSPHATE 25 MILLIGRAM(S): 80 CAPSULE, EXTENDED RELEASE ORAL at 11:32

## 2017-05-10 RX ADMIN — SERTRALINE 50 MILLIGRAM(S): 25 TABLET, FILM COATED ORAL at 11:31

## 2017-05-10 RX ADMIN — CARVEDILOL PHOSPHATE 25 MILLIGRAM(S): 80 CAPSULE, EXTENDED RELEASE ORAL at 22:46

## 2017-05-10 RX ADMIN — ATORVASTATIN CALCIUM 10 MILLIGRAM(S): 80 TABLET, FILM COATED ORAL at 22:46

## 2017-05-10 RX ADMIN — CEFEPIME 100 MILLIGRAM(S): 1 INJECTION, POWDER, FOR SOLUTION INTRAMUSCULAR; INTRAVENOUS at 11:32

## 2017-05-10 NOTE — CONSULT NOTE ADULT - ASSESSMENT
72 y/o F PMHx significant for severe PVD s/p Rt BKA (9/2016), LUE DVT, cardiac tumor s/p removal 1990s, CABG x 2 vessels, ESRD on HD x 3 yrs, AICD/PM, CAD, prior MI, CVA, DM2 now admitted on 5/9 from snf for evaluation of increased lethargy, fever and left upper extremity swelling and redness; subsequently found to have bilateral upper extremity thrombi; her dialysis access is in the left upper extremity and outpatient eval for AVF salvage was unsuccessful. Patient is lethargic and history per medical record.  1. Patient admitted with sepsis secondary to probable infected vascular access  - follow up cultures   - oxygen and nebs as needed  - would continue cefepime as ordered  - will add vancomycin to treat resistant bacteria but intermittently dosed given renal function, received one gram on 5/9  - vascular evaluation  - icu and supportive care  - consider palliative evaluation  2. other issues: severe PVD s/p Rt BKA (9/2016), LUE DVT, cardiac tumor s/p removal 1990s, CABG x 2 vessels, ESRD on HD x 3 yrs, AICD/PM, CAD, prior MI, CVA, DM2   - per medicine

## 2017-05-10 NOTE — PROGRESS NOTE ADULT - ASSESSMENT
71/F , DNR, admitted with     1) AMS/Metabolic encephalopathy d/t cellulitis + Left arm cellulitis + Infected Left arm AVF: Stable hemodynamically  transfer to medical floor  cont cefepime and vanco as per ID  f/u Blood cx    2) Clotted Left AVF + ESRD on HD:  appreciate Vascular Sx and renal consults  being dialyzed thru temp access in right groin  would do Venous doppler of b/l upper ext as advised by vascular sx.  would need an access for HD for long term    3) HTN:  cont amlodipine, coreg    4) DM 2:  ISS    5) Diet:  start pureed diet with aspiration precautions    6) CAD:  cont asa, statins, coreg    poc discussed with team

## 2017-05-10 NOTE — PROGRESS NOTE ADULT - SUBJECTIVE AND OBJECTIVE BOX
History of Present Illness: 	  70 y/o F PMHx significant for severe PVD s/p Rt BKA (2016), LUE DVT, cardiac tumor s/p removal , CABG x 2 vessels, ESRD on HD x 3 yrs, AICD/PM, CAD, prior MI, CVA, DM2 who was BIBA from [facility] where she was found to be increasingly lethargic, febrile, with left arm swelling and erythema. The patient was recently evaluated by her vascular surgeon Dr. Awadallah who diagnosed her w/ B/L UE thrombi on 2017.    5/10:  Pt more awake today as per RN but does not give any history.      PHYSICAL EXAM:    Daily     Daily Weight in k.7 (10 May 2017 05:25)    ICU Vital Signs Last 24 Hrs  T(C): 37.3, Max: 37.5 (05-10 @ 00:29)  T(F): 99.2, Max: 99.5 (05-10 @ 00:29)  HR: 97 (66 - 98)  BP: 157/44 (104/46 - 194/53)  BP(mean): 73 (63 - 156)  ABP: --  ABP(mean): --  RR: 23 (16 - 27)  SpO2: 92% (90% - 100%)      Constitutional: Weak and ill appearing; AMS, does not talk  HEENT: Atraumatic, ELLIOT  Respiratory: Breath Sounds normal, no rhonchi/wheeze  Cardiovascular: N S1S2; BOY present  Gastrointestinal: Abdomen soft, non tender, Bowel Sounds present  Extremities: Left arm swollen as compared to right and has edema, peripheral pulses present  Neurological: Arousable but not alert or oriented  Skin: Non cellulitic, no rash, ulcers  Lymph Nodes: No lymphadenopathy noted  Back: No CVA tenderness   Musculoskeletal: non tender  Breasts: Deferred  Genitourinary: deferred  Rectal: Deferred                          11.2   6.0   )-----------( 135      ( 10 May 2017 05:17 )             34.7       CBC Full  -  ( 10 May 2017 05:17 )  WBC Count : 6.0 K/uL  Hemoglobin : 11.2 g/dL  Hematocrit : 34.7 %  Platelet Count - Automated : 135 K/uL  Mean Cell Volume : 98.5 fl  Mean Cell Hemoglobin : 31.7 pg  Mean Cell Hemoglobin Concentration : 32.2 gm/dL  Auto Neutrophil # : 4.7 K/uL  Auto Lymphocyte # : 0.6 K/uL  Auto Monocyte # : 0.5 K/uL  Auto Eosinophil # : 0.2 K/uL  Auto Basophil # : 0.1 K/uL  Auto Neutrophil % : 78.7 %  Auto Lymphocyte % : 9.3 %  Auto Monocyte % : 7.7 %  Auto Eosinophil % : 3.0 %  Auto Basophil % : 1.2 %      05-10    137  |  100  |  45<H>  ----------------------------<  146<H>  4.3   |  27  |  4.43<H>    Ca    8.8      10 May 2017 05:17  Phos  3.2     10  Mg     2.2     05-10    TPro  7.0  /  Alb  3.3  /  TBili  0.4  /  DBili  x   /  AST  16  /  ALT  12  /  AlkPhos  172<H>  0510      LIVER FUNCTIONS - ( 10 May 2017 05:17 )  Alb: 3.3 g/dL / Pro: 7.0 gm/dL / ALK PHOS: 172 U/L / ALT: 12 U/L / AST: 16 U/L / GGT: x             PT/INR - ( 09 May 2017 12:53 )   PT: 11.7 sec;   INR: 1.08 ratio         PTT - ( 09 May 2017 12:53 )  PTT:33.5 sec          Urinalysis Basic - ( 09 May 2017 12:50 )    Color: Yellow / Appearance: very cloudy / S.010 / pH: x  Gluc: x / Ketone: Negative  / Bili: Small / Urobili: Negative mg/dL   Blood: x / Protein: 500 mg/dL / Nitrite: Negative   Leuk Esterase: Moderate / RBC: >50 /HPF / WBC 11-25   Sq Epi: x / Non Sq Epi: Moderate / Bacteria: Moderate            MEDICATIONS  (STANDING):  aspirin enteric coated 81milliGRAM(s) Oral daily  isosorbide   mononitrate ER Tablet (IMDUR) 60milliGRAM(s) Oral daily  gabapentin 300milliGRAM(s) Oral at bedtime  sertraline 50milliGRAM(s) Oral daily  atorvastatin 10milliGRAM(s) Oral at bedtime  carvedilol 25milliGRAM(s) Oral every 12 hours  amLODIPine   Tablet 5milliGRAM(s) Oral daily  fluorometholone 0.1% Suspension 1Drop(s) Both EYES two times a day  Nephro-mynor 1Tablet(s) Oral daily  heparin  Injectable 5000Unit(s) SubCutaneous every 8 hours

## 2017-05-10 NOTE — PROGRESS NOTE ADULT - ASSESSMENT
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  Pt has L UE avf, same side of AICD, with clotted UE bilaterally.. Pt recently seen by her surgeon Dr awadallah who dx her w bilat UE clots, she was also seen at [facility] yesterday, I spoke to Dr hearn.. He stated he could not get a through the clots adjacent to the PM wire to de clot the vein. he suggested ligating the avf may reduce the swelling..  Today arm is erythematous and pt is febrile, lethargic. IV abx  given in ER ( zosyn, rocephin, vanco)    D/W pts daughter, will place femoral line and dialyze today  ID consult, Dr Carrasco informed, suggested to place on Cefepime 1 g iv daily starting in am, after hd on hd days..  Dr Morin consulted for second opinion of av access..  lactate level w HD    5/10 SY  --ESRD  Continue TIW HD   Will plan HD 5/11 and 5/12 with plans to remove temporary femoral catheter post tx on 5/12.  Will plan permanent access depending on results of blood cultures.  ID and Vascular follow up for permanent access and ligation of Left UE AVF.  --AMS  likely due to sepsis.  Continue to follow closely  --PVC : check repeat CXR.

## 2017-05-10 NOTE — CONSULT NOTE ADULT - SUBJECTIVE AND OBJECTIVE BOX
HPI:  72 y/o F PMHx significant for severe PVD s/p Rt BKA (2016), LUE DVT, cardiac tumor s/p removal , CABG x 2 vessels, ESRD on HD x 3 yrs, AICD/PM, CAD, prior MI, CVA, DM2 now admitted on  from snf for evaluation of increased lethargy, fever and left upper extremity swelling and redness; subsequently found to have bilateral upper extremity thrombi; her dialysis access is in the left upper extremity and outpatient eval for AVF salvage was unsuccessful. Patient is lethargic and history per medical record.          PMH: as above  PSH: as above  Meds: per reconcilation sheet, noted below  MEDICATIONS  (STANDING):  aspirin enteric coated 81milliGRAM(s) Oral daily  isosorbide   mononitrate ER Tablet (IMDUR) 60milliGRAM(s) Oral daily  gabapentin 300milliGRAM(s) Oral at bedtime  sertraline 50milliGRAM(s) Oral daily  atorvastatin 10milliGRAM(s) Oral at bedtime  carvedilol 25milliGRAM(s) Oral every 12 hours  amLODIPine   Tablet 5milliGRAM(s) Oral daily  fluorometholone 0.1% Suspension 1Drop(s) Both EYES two times a day  Nephro-mynor 1Tablet(s) Oral daily  heparin  Injectable 5000Unit(s) SubCutaneous every 8 hours  cefepime  IVPB 1000milliGRAM(s) IV Intermittent daily    MEDICATIONS  (PRN):  acetaminophen   Tablet 650milliGRAM(s) Oral every 6 hours PRN For Temp greater than 38 C (100.4 F)  acetaminophen   Tablet. 650milliGRAM(s) Oral every 6 hours PRN Mild Pain (1 - 3)  metoclopramide Injectable 10milliGRAM(s) IV Push once PRN Nausea and/or Vomiting  senna 2Tablet(s) Oral at bedtime PRN Constipation  docusate sodium 100milliGRAM(s) Oral three times a day PRN Constipation    Allergies    No Known Allergies    Intolerances      Social: no smoking, no alcohol, no illegal drugs; no recent travel, no exposure to TB  FAMILY HISTORY:  No pertinent family history in first degree relatives    ROS: unavailable due to medical condition  Vital Signs Last 24 Hrs  T(C): 36.9, Max: 38.3 (05-09 @ 12:30)  T(F): 98.4, Max: 101 ( @ 12:30)  HR: 91 (66 - 91)  BP: 169/151 (104/46 - 194/53)  BP(mean): 156 (63 - 156)  RR: 26 (14 - 26)  SpO2: 100% (93% - 100%)  Daily Height in cm: 162.56 (09 May 2017 12:24)    Daily Weight in k.7 (10 May 2017 05:25)  Constitutional: frail looking  HEENT: NC/AT, EOMI, PERRLA  Neck: supple  Respiratory: clear  Cardiovascular: S1S2 regular, no murmurs  Abdomen: soft, not tender, not distended, positive BS  Genitourinary: deferred  Rectal: deferred  Musculoskeletal: right AKA  Neurological: lethargic  Skin: no rashes                          11.2   6.0   )-----------( 135      ( 10 May 2017 05:17 )             34.7     05-10    137  |  100  |  45<H>  ----------------------------<  146<H>  4.3   |  27  |  4.43<H>    Ca    8.8      10 May 2017 05:17  Phos  3.2     05-10  Mg     2.2     05-10    TPro  7.0  /  Alb  3.3  /  TBili  0.4  /  DBili  x   /  AST  16  /  ALT  12  /  AlkPhos  172<H>  05-10     LIVER FUNCTIONS - ( 10 May 2017 05:17 )  Alb: 3.3 g/dL / Pro: 7.0 gm/dL / ALK PHOS: 172 U/L / ALT: 12 U/L / AST: 16 U/L / GGT: x           Urinalysis Basic - ( 09 May 2017 12:50 )    Color: Yellow / Appearance: very cloudy / S.010 / pH: x  Gluc: x / Ketone: Negative  / Bili: Small / Urobili: Negative mg/dL   Blood: x / Protein: 500 mg/dL / Nitrite: Negative   Leuk Esterase: Moderate / RBC: >50 /HPF / WBC 11-25   Sq Epi: x / Non Sq Epi: Moderate / Bacteria: Moderate          Radiology:EXAM:  CHEST SINGLE VIEW FRONTAL                            PROCEDURE DATE:  2017        INTERPRETATION:    INDICATION: Fever.    Single frontal view of chest dated 2017 1:02 PM.  No previous studies   are available for comparison.    FINDINGS /   IMPRESSION:     There is right perihilar prominence and pulmonary vascular engorgement   suggestive of interstitial edema. There is probable small left effusion.   There is cardiomegaly. Status post sternotomy. Pacemaker/ICD tip in   region of right ventricle.       Advanced directive addressed: full resuscitation

## 2017-05-10 NOTE — PROGRESS NOTE ADULT - SUBJECTIVE AND OBJECTIVE BOX
NEPHROLOGY INTERVAL HPI/OVERNIGHT EVENTS:  5/10 SY  Awake but remains confused.  At baseline, pt is fully lucid and oriented.  No acute distress noted.    71 yr old f h/o cardiac tumor s/p removal , CABG x 2 vessels, HD x 3 yrs, PVD, R bka 2016, AICD/PM, x 4 yrs, MI, CVA, DM.  Pt has L UE avf, same side of AICD, with clotted UE bilaterally.. Pt recently seen by her surgeon Dr awadallah who dx her w bilat UE clots, she was also seen at [facility] yesterday, I spoke to Dr hearn.. He stated he could not get a through the clots adjacent to the PM wire to de clot the vein. he suggested ligating the avf may reduce the swelling..  Today arm is erythematous and pt is febrile, lethargic. IV abx  given in ER ( zosyn, rocephin, vanco)    MEDICATIONS  (STANDING):  aspirin enteric coated 81milliGRAM(s) Oral daily  isosorbide   mononitrate ER Tablet (IMDUR) 60milliGRAM(s) Oral daily  gabapentin 300milliGRAM(s) Oral at bedtime  sertraline 50milliGRAM(s) Oral daily  atorvastatin 10milliGRAM(s) Oral at bedtime  carvedilol 25milliGRAM(s) Oral every 12 hours  amLODIPine   Tablet 5milliGRAM(s) Oral daily  fluorometholone 0.1% Suspension 1Drop(s) Both EYES two times a day  Nephro-mynor 1Tablet(s) Oral daily  heparin  Injectable 5000Unit(s) SubCutaneous every 8 hours  cefepime  IVPB 1000milliGRAM(s) IV Intermittent daily    MEDICATIONS  (PRN):  acetaminophen   Tablet 650milliGRAM(s) Oral every 6 hours PRN For Temp greater than 38 C (100.4 F)  acetaminophen   Tablet. 650milliGRAM(s) Oral every 6 hours PRN Mild Pain (1 - 3)  metoclopramide Injectable 10milliGRAM(s) IV Push once PRN Nausea and/or Vomiting  senna 2Tablet(s) Oral at bedtime PRN Constipation  docusate sodium 100milliGRAM(s) Oral three times a day PRN Constipation    Vital Signs Last 24 Hrs  T(C): 37.3, Max: 38.3 ( @ 12:30)  T(F): 99.2, Max: 101 ( @ 12:30)  HR: 95 (66 - 95)  BP: 165/67 (104/46 - 194/53)  BP(mean): 90 (63 - 156)  RR: 23 (14 - 26)  SpO2: 92% (90% - 100%)  Daily Height in cm: 162.56 (09 May 2017 12:24)    Daily Weight in k.7 (10 May 2017 05:25)      PHYSICAL EXAM:  Awake but confused.  unable to answer any questions.  Agitated.  GENERAL: No apparent distress  CHEST/LUNG: Scattered rhonchi  HEART: S1S2 RRR  ABDOMEN:soft   EXTREMITIES: positive edema  SKIN:     LABS:                        11.2   6.0   )-----------( 135      ( 10 May 2017 05:17 )             34.7     10    137  |  100  |  45<H>  ----------------------------<  146<H>  4.3   |  27  |  4.43<H>    Ca    8.8      10 May 2017 05:17  Phos  3.2     10  Mg     2.2     05-10    TPro  7.0  /  Alb  3.3  /  TBili  0.4  /  DBili  x   /  AST  16  /  ALT  12  /  AlkPhos  172<H>  05-10    PT/INR - ( 09 May 2017 12:53 )   PT: 11.7 sec;   INR: 1.08 ratio         PTT - ( 09 May 2017 12:53 )  PTT:33.5 sec  Urinalysis Basic - ( 09 May 2017 12:50 )    Color: Yellow / Appearance: very cloudy / S.010 / pH: x  Gluc: x / Ketone: Negative  / Bili: Small / Urobili: Negative mg/dL   Blood: x / Protein: 500 mg/dL / Nitrite: Negative   Leuk Esterase: Moderate / RBC: >50 /HPF / WBC 11-25   Sq Epi: x / Non Sq Epi: Moderate / Bacteria: Moderate      Magnesium, Serum: 2.2 mg/dL (05-10 @ 05:17)  Phosphorus Level, Serum: 3.2 mg/dL (05-10 @ 05:17)  Magnesium, Serum: 2.4 mg/dL ( @ 12:53)  Intact PTH: 195 pg/mL ( @ 12:53)          RADIOLOGY & ADDITIONAL TESTS:

## 2017-05-11 LAB
ALBUMIN SERPL ELPH-MCNC: 3 G/DL — LOW (ref 3.3–5)
ANION GAP SERPL CALC-SCNC: 11 MMOL/L — SIGNIFICANT CHANGE UP (ref 5–17)
BUN SERPL-MCNC: 64 MG/DL — HIGH (ref 7–23)
CALCIUM SERPL-MCNC: 8.6 MG/DL — SIGNIFICANT CHANGE UP (ref 8.5–10.1)
CALCIUM SERPL-MCNC: 8.9 MG/DL — SIGNIFICANT CHANGE UP (ref 8.4–10.5)
CHLORIDE SERPL-SCNC: 101 MMOL/L — SIGNIFICANT CHANGE UP (ref 96–108)
CO2 SERPL-SCNC: 25 MMOL/L — SIGNIFICANT CHANGE UP (ref 22–31)
CREAT SERPL-MCNC: 5.88 MG/DL — HIGH (ref 0.5–1.3)
GLUCOSE SERPL-MCNC: 133 MG/DL — HIGH (ref 70–99)
HCT VFR BLD CALC: 33.9 % — LOW (ref 34.5–45)
HGB BLD-MCNC: 10.3 G/DL — LOW (ref 11.5–15.5)
MCHC RBC-ENTMCNC: 30.2 PG — SIGNIFICANT CHANGE UP (ref 27–34)
MCHC RBC-ENTMCNC: 30.5 GM/DL — LOW (ref 32–36)
MCV RBC AUTO: 99.1 FL — SIGNIFICANT CHANGE UP (ref 80–100)
PHOSPHATE SERPL-MCNC: 4.4 MG/DL — SIGNIFICANT CHANGE UP (ref 2.5–4.5)
PLATELET # BLD AUTO: 142 K/UL — LOW (ref 150–400)
POTASSIUM SERPL-MCNC: 4.7 MMOL/L — SIGNIFICANT CHANGE UP (ref 3.5–5.3)
POTASSIUM SERPL-SCNC: 4.7 MMOL/L — SIGNIFICANT CHANGE UP (ref 3.5–5.3)
PTH-INTACT FLD-MCNC: 234 PG/ML — HIGH (ref 15–65)
RBC # BLD: 3.42 M/UL — LOW (ref 3.8–5.2)
RBC # FLD: 15.8 % — HIGH (ref 10.3–14.5)
SODIUM SERPL-SCNC: 137 MMOL/L — SIGNIFICANT CHANGE UP (ref 135–145)
WBC # BLD: 5.4 K/UL — SIGNIFICANT CHANGE UP (ref 3.8–10.5)
WBC # FLD AUTO: 5.4 K/UL — SIGNIFICANT CHANGE UP (ref 3.8–10.5)

## 2017-05-11 PROCEDURE — 71010: CPT | Mod: 26

## 2017-05-11 RX ORDER — DOXERCALCIFEROL 2.5 UG/1
1 CAPSULE ORAL
Qty: 0 | Refills: 0 | Status: DISCONTINUED | OUTPATIENT
Start: 2017-05-11 | End: 2017-05-19

## 2017-05-11 RX ORDER — CARVEDILOL PHOSPHATE 80 MG/1
12.5 CAPSULE, EXTENDED RELEASE ORAL EVERY 12 HOURS
Qty: 0 | Refills: 0 | Status: DISCONTINUED | OUTPATIENT
Start: 2017-05-11 | End: 2017-05-19

## 2017-05-11 RX ORDER — ERYTHROPOIETIN 10000 [IU]/ML
8000 INJECTION, SOLUTION INTRAVENOUS; SUBCUTANEOUS
Qty: 0 | Refills: 0 | Status: DISCONTINUED | OUTPATIENT
Start: 2017-05-11 | End: 2017-05-19

## 2017-05-11 RX ADMIN — ISOSORBIDE MONONITRATE 60 MILLIGRAM(S): 60 TABLET, EXTENDED RELEASE ORAL at 13:19

## 2017-05-11 RX ADMIN — DOXERCALCIFEROL 1 MICROGRAM(S): 2.5 CAPSULE ORAL at 11:25

## 2017-05-11 RX ADMIN — FLUOROMETHOLONE 1 DROP(S): 1 SOLUTION/ DROPS OPHTHALMIC at 18:44

## 2017-05-11 RX ADMIN — ERYTHROPOIETIN 8000 UNIT(S): 10000 INJECTION, SOLUTION INTRAVENOUS; SUBCUTANEOUS at 11:25

## 2017-05-11 RX ADMIN — FLUOROMETHOLONE 1 DROP(S): 1 SOLUTION/ DROPS OPHTHALMIC at 05:50

## 2017-05-11 RX ADMIN — Medication 81 MILLIGRAM(S): at 13:19

## 2017-05-11 RX ADMIN — AMLODIPINE BESYLATE 5 MILLIGRAM(S): 2.5 TABLET ORAL at 05:49

## 2017-05-11 RX ADMIN — ATORVASTATIN CALCIUM 10 MILLIGRAM(S): 80 TABLET, FILM COATED ORAL at 21:01

## 2017-05-11 RX ADMIN — CARVEDILOL PHOSPHATE 12.5 MILLIGRAM(S): 80 CAPSULE, EXTENDED RELEASE ORAL at 18:44

## 2017-05-11 RX ADMIN — CARVEDILOL PHOSPHATE 25 MILLIGRAM(S): 80 CAPSULE, EXTENDED RELEASE ORAL at 05:49

## 2017-05-11 RX ADMIN — HEPARIN SODIUM 5000 UNIT(S): 5000 INJECTION INTRAVENOUS; SUBCUTANEOUS at 05:49

## 2017-05-11 RX ADMIN — SERTRALINE 50 MILLIGRAM(S): 25 TABLET, FILM COATED ORAL at 13:20

## 2017-05-11 RX ADMIN — GABAPENTIN 300 MILLIGRAM(S): 400 CAPSULE ORAL at 21:01

## 2017-05-11 RX ADMIN — HEPARIN SODIUM 5000 UNIT(S): 5000 INJECTION INTRAVENOUS; SUBCUTANEOUS at 21:01

## 2017-05-11 NOTE — PROGRESS NOTE ADULT - ASSESSMENT
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  Pt has L UE avf, same side of AICD, with clotted UE bilaterally.. Pt recently seen by her surgeon Dr awadallah who dx her w bilat UE clots, she was also seen at [facility] yesterday, I spoke to Dr hearn.. He stated he could not get a through the clots adjacent to the PM wire to de clot the vein. he suggested ligating the avf may reduce the swelling..  Today arm is erythematous and pt is febrile, lethargic. IV abx  given in ER ( zosyn, rocephin, vanco)    D/W pts daughter, will place femoral line and dialyze today  ID consult, Dr Carrasco informed, suggested to place on Cefepime 1 g iv daily starting in am, after hd on hd days..  Dr Morin consulted for second opinion of av access..  lactate level w HD    5/10 SY  --ESRD  Continue TIW HD   Will plan HD 5/11 and 5/12 with plans to remove temporary femoral catheter post tx on 5/12.  Will plan permanent access depending on results of blood cultures.  ID and Vascular follow up for permanent access and ligation of Left UE AVF.  --AMS  likely due to sepsis.  Continue to follow closely  --PVC : check repeat CXR.    5/11 SY  --ESRD   Tolerating tx fairly ok.  HD order and tx reviewed.  Will plan HD again in am with plans to remove femoral catheter post HD tomorrow.  D/W Dr William.  --HD access.   Plan for Permcath on monday for now.   Awaiting call back from Dr Peters re further plans for current AVF ligation and creation of a new AVF possibly.  --ID  : continue abtx for LUE cellulitis.  --AMS improving  --PVC  : follow up repeat CXR.  Will attempt to increase UF with HD.  BP borderline on HD today.   D/c Amlodipine and decrease dose of Coreg.

## 2017-05-11 NOTE — DIETITIAN INITIAL EVALUATION ADULT. - PERTINENT LABORATORY DATA
5/11: Hgb/Hct: 10.3/33.9 (L), Glu:133 (H), Albumin: 3 (L), BUN: 64 (H), Creat: 5.88 (H), GFR: 7 (L), PTH: 234 (H)

## 2017-05-11 NOTE — CONSULT NOTE ADULT - ASSESSMENT
70 yo F coming from [facility] w/ PMHx for severe PVD s/p Rt BKA (9/2016), LUE DVT, cardiac tumor s/p removal 1990s, CABG x 2 vessels, ESRD on HD x 3 yrs, AICD/PM, CAD, prior MI, CVA, DM2 now admitted on 5/9 for increased lethargy, fever and left upper extremity swelling and redness. Found to have bilateral upper extremity thrombi compromising her dialysis access, s/p outpatient eval for AVF salvage which was unsuccessful. Being treated for resultant cellulitis. Palliative care consulted to help establish GOC.    1) AMS  - likely 2/2 to delirium from underlying infection  - fall and aspiration precautions  - appears to improved somewhat from admission, though not back to noted baseline    2) Cellulitis/ AVF obstruction  - renal, ID, and surgical notes appreciated  - on IV abx  - with temporary access currently and plan for permacath monday    3) ESRD  - nephrology notes appreciated  - tolerating HD  - on HD x3 years    4) Prognosis  - guarded  - likely overall poor given complicated PMHx including ESRD, severe PVD, propensity to clot, immobility, and cardiac disease    5) GOC/advanced disease  - pt does not have capacity currently for medical decisions  - no HCP on file,  Darrel Aguilar [phone] would serve as surrogate  - full code currently  - will reach out to  to arrange family meeting to discuss GOC    Thank you for including us in Mrs. Aguilar's care. Will continue to follow with you.    Jocelynn Garcia MD  Palliative Care Attending

## 2017-05-11 NOTE — PROGRESS NOTE ADULT - SUBJECTIVE AND OBJECTIVE BOX
HPI:  70 y/o F PMHx significant for severe PVD s/p Rt BKA (2016), LUE DVT, cardiac tumor s/p removal , CABG x 2 vessels, ESRD on HD x 3 yrs, AICD/PM, CAD, prior MI, CVA, DM2 now admitted on  from snf for evaluation of increased lethargy, fever and left upper extremity swelling and redness; subsequently found to have bilateral upper extremity thrombi; her dialysis access is in the left upper extremity and outpatient eval for AVF salvage was unsuccessful. Patient is lethargic and history per medical record.  Today  patient undergoing HD, moans when spoken to; had low grade fever yesterday late pm    MEDICATIONS  (STANDING):  aspirin enteric coated 81milliGRAM(s) Oral daily  isosorbide   mononitrate ER Tablet (IMDUR) 60milliGRAM(s) Oral daily  gabapentin 300milliGRAM(s) Oral at bedtime  sertraline 50milliGRAM(s) Oral daily  atorvastatin 10milliGRAM(s) Oral at bedtime  fluorometholone 0.1% Suspension 1Drop(s) Both EYES two times a day  Nephro-mynor 1Tablet(s) Oral daily  heparin  Injectable 5000Unit(s) SubCutaneous every 8 hours  doxercalciferol Injectable 1MICROGram(s) IV Push <User Schedule>  carvedilol 12.5milliGRAM(s) Oral every 12 hours  epoetin hazel Injectable 8000Unit(s) IV Push <User Schedule>    MEDICATIONS  (PRN):  acetaminophen   Tablet 650milliGRAM(s) Oral every 6 hours PRN For Temp greater than 38 C (100.4 F)  acetaminophen   Tablet. 650milliGRAM(s) Oral every 6 hours PRN Mild Pain (1 - 3)  metoclopramide Injectable 10milliGRAM(s) IV Push once PRN Nausea and/or Vomiting  senna 2Tablet(s) Oral at bedtime PRN Constipation  docusate sodium 100milliGRAM(s) Oral three times a day PRN Constipation      Vital Signs Last 24 Hrs  T(C): 36.6, Max: 38.1 (05-10 @ 15:37)  T(F): 97.9, Max: 100.5 (05-10 @ 15:37)  HR: 70 (60 - 88)  BP: 166/41 (67/46 - 166/41)  BP(mean): 64 (59 - 75)  RR: 17 (16 - 25)  SpO2: 88% (88% - 100%)    Physical Exam:        HEENT: NC/AT, EOMI, PERRLA  Neck: supple  Respiratory: clear  Cardiovascular: S1S2 regular, no murmurs  Abdomen: soft, not tender, not distended, positive BS  Genitourinary: deferred  Rectal: deferred  Musculoskeletal: right AKA  Neurological: lethargic  Skin: no rashes                          11.2   6.0   )-----------( 135      ( 10 May 2017 05:17 )             34.7     05-10    137  |  100  |  45<H>  ----------------------------<  146<H>  4.3   |  27  |  4.43<H>    Ca    8.8      10 May 2017 05:17  Phos  3.2     05-10  Mg     2.2     -10    TPro  7.0  /  Alb  3.3  /  TBili  0.4  /  DBili  x   /  AST  16  /  ALT  12  /  AlkPhos  172<H>  05-10     LIVER FUNCTIONS - ( 10 May 2017 05:17 )  Alb: 3.3 g/dL / Pro: 7.0 gm/dL / ALK PHOS: 172 U/L / ALT: 12 U/L / AST: 16 U/L / GGT: x           Urinalysis Basic - ( 09 May 2017 12:50 )    Color: Yellow / Appearance: very cloudy / S.010 / pH: x  Gluc: x / Ketone: Negative  / Bili: Small / Urobili: Negative mg/dL   Blood: x / Protein: 500 mg/dL / Nitrite: Negative   Leuk Esterase: Moderate / RBC: >50 /HPF / WBC 11-25   Sq Epi: x / Non Sq Epi: Moderate / Bacteria: Moderate    Culture - Blood (17 @ 13:28)    Specimen Source: .Blood None    Culture Results:   No growth to date.    Culture - Blood (17 @ 12:53)    Specimen Source: .Blood Blood-Peripheral    Culture Results:   No growth to date.    Culture - Urine (17 @ 12:50)    Specimen Source: .Urine Catheterized    Culture Results:   10,000 - 49,000 CFU/mL Streptococcus agalactiae (Group B)  Group B streptococci are susceptible to ampicillin,  penicillin and cefazolin, but may be resistant to  erythromycin and clindamycin.  Recommendations for intrapartum prophylaxis for Group B  streptococci are penicillin or ampicillin.  Revised urine counts reflect clinically significant counts  established by evidence based medicine.          Radiology:EXAM:  CHEST SINGLE VIEW FRONTAL                            PROCEDURE DATE:  2017        INTERPRETATION:    INDICATION: Fever.    Single frontal view of chest dated 2017 1:02 PM.  No previous studies   are available for comparison.    FINDINGS /   IMPRESSION:     There is right perihilar prominence and pulmonary vascular engorgement   suggestive of interstitial edema. There is probable small left effusion.   There is cardiomegaly. Status post sternotomy. Pacemaker/ICD tip in   region of right ventricle.       Advanced directive addressed: full resuscitation

## 2017-05-11 NOTE — PROGRESS NOTE ADULT - SUBJECTIVE AND OBJECTIVE BOX
upper extremity duplex noted which is inconsistent with history of thrombosed L upper extremity central venous anatomy and "bilateral" upper extremity thrombosed venous system.    discussed case with Dr. Quinones and will plan for L upper arm AVF ligation AFTER speaking with Dr. White at [facility] to confirm his impression of thrombosed ? innominate vein    Plan for placement of R IJ tunnelled cuffed catheter at same procedure    MEDICATIONS  (STANDING):  aspirin enteric coated 81milliGRAM(s) Oral daily  isosorbide   mononitrate ER Tablet (IMDUR) 60milliGRAM(s) Oral daily  gabapentin 300milliGRAM(s) Oral at bedtime  sertraline 50milliGRAM(s) Oral daily  atorvastatin 10milliGRAM(s) Oral at bedtime  fluorometholone 0.1% Suspension 1Drop(s) Both EYES two times a day  Nephro-mynor 1Tablet(s) Oral daily  heparin  Injectable 5000Unit(s) SubCutaneous every 8 hours  doxercalciferol Injectable 1MICROGram(s) IV Push <User Schedule>  carvedilol 12.5milliGRAM(s) Oral every 12 hours  epoetin hazel Injectable 8000Unit(s) IV Push <User Schedule>    MEDICATIONS  (PRN):  acetaminophen   Tablet 650milliGRAM(s) Oral every 6 hours PRN For Temp greater than 38 C (100.4 F)  acetaminophen   Tablet. 650milliGRAM(s) Oral every 6 hours PRN Mild Pain (1 - 3)  metoclopramide Injectable 10milliGRAM(s) IV Push once PRN Nausea and/or Vomiting  senna 2Tablet(s) Oral at bedtime PRN Constipation  docusate sodium 100milliGRAM(s) Oral three times a day PRN Constipation      Allergies    No Known Allergies    Intolerances        Flatus: [ ] YES [ ] NO             Bowel Movement: [ ] YES [ ] NO  Pain (0-10):            Pain Control Adequate: [ ] YES [ ] NO  Nausea: [ ] YES [ ] NO            Vomiting: [ ] YES [ ] NO  Diarrhea: [ ] YES [ ] NO         Constipation: [ ] YES [ ] NO     Chest Pain: [ ] YES [ ] NO    SOB:  [ ] YES [ ] NO    Vital Signs Last 24 Hrs  T(C): 36.6, Max: 37.3 (05-11 @ 00:00)  T(F): 97.9, Max: 99.2 (05-11 @ 00:00)  HR: 69 (60 - 87)  BP: 163/53 (67/46 - 169/63)  BP(mean): 80 (59 - 88)  RR: 15 (15 - 21)  SpO2: 88% (88% - 100%)    I&O's Summary      Physical Exam:  General: NAD, resting comfortably  Pulmonary: normal resp effort, CTA-B  Cardiovascular: NSR  Abdominal: soft, NT/ND  Extremities: WWP, normal strength  Neuro: A/O x 3, CNs II-XII grossly intact, normal motor/sensation, no focal deficits  Pulses:   Right:                                                                          Left:  FEM [ ]2+ [ ]1+ [ ]doppler                                             FEM [ ]2+ [ ]1+ [ ]doppler    POP [ ]2+ [ ]1+ [ ]doppler                                             POP [ ]2+ [ ]1+ [ ]doppler    DP [ ]2+ [ ]1+ [ ]doppler                                                DP [ ]2+ [ ]1+ [ ]doppler  PT[ ]2+ [ ]1+ [ ]doppler                                                  PT [ ]2+ [ ]1+ [ ]doppler    LABS:                        10.3   5.4   )-----------( 142      ( 11 May 2017 05:43 )             33.9     05-11    137  |  101  |  64<H>  ----------------------------<  133<H>  4.7   |  25  |  5.88<H>    Ca    8.6      11 May 2017 05:43  Phos  4.4     05-11  Mg     2.2     05-10    TPro  x   /  Alb  3.0<L>  /  TBili  x   /  DBili  x   /  AST  x   /  ALT  x   /  AlkPhos  x   05-11        LIVER FUNCTIONS - ( 11 May 2017 05:43 )  Alb: 3.0 g/dL / Pro: x     / ALK PHOS: x     / ALT: x     / AST: x     / GGT: x           CAPILLARY BLOOD GLUCOSE      RADIOLOGY & ADDITIONAL TESTS:

## 2017-05-11 NOTE — CONSULT NOTE ADULT - SUBJECTIVE AND OBJECTIVE BOX
HPI: Mrs. Aguilar is a 70 yo F coming from [facility] w/ PMHx for severe PVD s/p Rt BKA (2016), LUE DVT, cardiac tumor s/p removal , CABG x 2 vessels, ESRD on HD x 3 yrs, AICD/PM, CAD, prior MI, CVA, DM2 now admitted on  for increased lethargy, fever and left upper extremity swelling and redness. Found to have bilateral upper extremity thrombi compromising her dialysis access, s/p outpatient eval for AVF salvage which was unsuccessful. Being treated for resultant cellulitis. Palliative care consulted to help establish GOC.    Met Mrs. Aguilar this afternoon, no family at bedside. Pt confused, calling out, saying "I am going to choke" though denying pain or dyspnea, coughing on occasion. Able to orient to hospital (with multiple attempts at questioning) and year, but unable to give any more history or contribute further to hpi. As per nursing, pt has been like this all day, with barely any intake. Though has not complained of any pain. As per nephrology notes, well known to them, pt was lucid at baseline.    Plan to attempt to call  to arrange family meeting to discuss GOC.      PAIN: ( )Yes   (x )No  Level:  Location:  Intensity:    /10  Quality:  Aggravating Factors:  Alleviating Factors:  Radiation:  Duration/Timing:  Impact on ADLs:    DYSPNEA: ( ) Yes  (x ) No  Level:    PAST MEDICAL & SURGICAL HISTORY:  AICD (automatic cardioverter/defibrillator) present  AVF (arteriovenous fistula): Left  DM2 (diabetes mellitus, type 2)  CVA (cerebral vascular accident)  MI (myocardial infarction)  CAD (coronary artery disease)  ESRD on hemodialysis  Cervical carcinoma  No pertinent past medical history  H/O bilateral oophorectomy  S/P BKA (below knee amputation) unilateral, right  S/P CABG (coronary artery bypass graft)  No significant past surgical history      SOCIAL HX: Lives at [facility]    Hx opiate tolerance ( )YES  (x )NO    Baseline ADLs  (Prior to Admission)  ( ) Independent   (x )Dependent    FAMILY HISTORY:  No pertinent family history in first degree relatives      Review of Systems:    Cough- yes  Secretions- yes  Delirium- yes    Unable to obtain/Limited due to: Delirium      PHYSICAL EXAM:    Vital Signs Last 24 Hrs  T(C): 36.6, Max: 37.3 (05-11 @ 00:00)  T(F): 97.9, Max: 99.2 ( @ 00:00)  HR: 69 (60 - 87)  BP: 163/53 (67/46 - 169/63)  BP(mean): 80 (59 - 88)  RR: 15 (15 - 21)  SpO2: 88% (88% - 100%)  Daily     Daily Weight in k.9 (11 May 2017 11:38)    PPSV2: 30  %  FAST:    General: older female, appears older than age, agitated, but calmed  Mental Status: confused, AOx2 (place, time, not circumstance)  HEENT: dmm, perrl, eomi  Lungs: dec at bases bl  Cardiac: +s1 s2 rrr  GI: soft nt nd +bs  : diaper in place  Ext: +swelling in LUE with erythema, no ttp, dilated  superficial veins above clavicular line, rt bka  Neuro: moves extremities confused, follows some commands      LABS:                        10.3   5.4   )-----------( 142      ( 11 May 2017 05:43 )             33.9     11    137  |  101  |  64<H>  ----------------------------<  133<H>  4.7   |  25  |  5.88<H>    Ca    8.6      11 May 2017 05:43  Phos  4.4     11  Mg     2.2     05-10    TPro  x   /  Alb  3.0<L>  /  TBili  x   /  DBili  x   /  AST  x   /  ALT  x   /  AlkPhos  x   11      Albumin: Albumin, Serum: 3.0 g/dL ( @ 05:43)      Allergies    No Known Allergies    Intolerances      MEDICATIONS  (STANDING):  aspirin enteric coated 81milliGRAM(s) Oral daily  isosorbide   mononitrate ER Tablet (IMDUR) 60milliGRAM(s) Oral daily  gabapentin 300milliGRAM(s) Oral at bedtime  sertraline 50milliGRAM(s) Oral daily  atorvastatin 10milliGRAM(s) Oral at bedtime  fluorometholone 0.1% Suspension 1Drop(s) Both EYES two times a day  Nephro-mynor 1Tablet(s) Oral daily  heparin  Injectable 5000Unit(s) SubCutaneous every 8 hours  doxercalciferol Injectable 1MICROGram(s) IV Push <User Schedule>  carvedilol 12.5milliGRAM(s) Oral every 12 hours  epoetin hazel Injectable 8000Unit(s) IV Push <User Schedule>    MEDICATIONS  (PRN):  acetaminophen   Tablet 650milliGRAM(s) Oral every 6 hours PRN For Temp greater than 38 C (100.4 F)  acetaminophen   Tablet. 650milliGRAM(s) Oral every 6 hours PRN Mild Pain (1 - 3)  metoclopramide Injectable 10milliGRAM(s) IV Push once PRN Nausea and/or Vomiting  senna 2Tablet(s) Oral at bedtime PRN Constipation  docusate sodium 100milliGRAM(s) Oral three times a day PRN Constipation      RADIOLOGY/ADDITIONAL STUDIES:    EXAM:  CHEST SINGLE VIEW FRONTAL                            PROCEDURE DATE:  2017        INTERPRETATION:  Exam Date: 2017 10:18 AM    Chest radiograph (one view)    CLINICAL INFORMATION: Shortness of Breath    TECHNIQUE:  Single frontal view of the chest was obtained.    COMPARISON: May 9, 2017    FINDINGS/  IMPRESSION:      Small left pleural effusion with underlying atelectasis and/or infiltrate   is unchanged. Median sternotomy. Left chest wall pacemaker in place.    Cardiomegaly present.    LIBERTY HAMILTON M.D., ATTENDING RADIOLOGIST  This document has been electronically signed. May 11 2017 11:03AM    EXAM:  US DPLX UPR EXT VEINS COMPL BI                        PROCEDURE DATE:  05/10/2017    INTERPRETATION:  US DPLX UPR EXT VEINS COMPL BI    HISTORY: Diffuse left arm swelling.  COMPARISON: None.    Technique: Using grayscale with limitedcompression, color, and spectral   Doppler, study is performed for the right and left upper extremity.      Right: Doppler flow was demonstrated in the right internal jugular vein,   subclavian vein, axillary vein, and dominant brachial vein.  The cephalic   and basilic veins are patent and compressible. Median cubital vein is   patent.    Left: Doppler flow was demonstrated in the left internal jugular vein,   subclavian vein, axillary vein, and dominant brachial vein. Left brachial   to basilicfistula with graft material is seen. Flow is seen throughout   the graft.    IMPRESSION:  No sonographic evidence for deep vein thrombosis in the   right and left upper extremity. Patent left brachiobasilic fistula graft.        MORRONOE LAWSON   This document has been electronically signed. May 10 2017  4:59PM

## 2017-05-11 NOTE — PROGRESS NOTE ADULT - ASSESSMENT
71/F , DNR, admitted with     1) AMS/Metabolic encephalopathy d/t cellulitis + Left arm cellulitis + Infected Left arm AVF: Stable hemodynamically  transfer to medical floor  cont cefepime and vanco as per ID  f/u Blood cx    2) ?Clotted Left AVF + ESRD on HD: Patent  left AVF on doppler  appreciate Vascular Sx and renal consults  being dialyzed thru temp access in right groin  would do Venous doppler of b/l upper ext as advised by vascular sx.: patent Left AVF  would need an access for HD for long term    3) HTN:  cont amlodipine, coreg    4) DM 2:  ISS    5) Diet:  start pureed diet with aspiration precautions    6) CAD:  cont asa, statins, coreg    poc discussed with team

## 2017-05-11 NOTE — PROGRESS NOTE ADULT - SUBJECTIVE AND OBJECTIVE BOX
NEPHROLOGY INTERVAL HPI/OVERNIGHT EVENTS:   SY  No acute event overnight.  Appears more comfortable.  Calm today and answering some questions appropriately.  Blood cultures negative so far.    5/10 SY  Awake but remains confused.  At baseline, pt is fully lucid and oriented.  No acute distress noted.    71 yr old f h/o cardiac tumor s/p removal , CABG x 2 vessels, HD x 3 yrs, PVD, R bka 2016, AICD/PM, x 4 yrs, MI, CVA, DM.  Pt has L UE avf, same side of AICD, with clotted UE bilaterally.. Pt recently seen by her surgeon Dr awadallah who dx her w bilat UE clots, she was also seen at [facility], I spoke to Dr hearn.. He stated he could not get a through the clots adjacent to the PM wire to de clot the vein. he suggested ligating the avf may reduce the swelling..  Today arm is erythematous and pt is febrile, lethargic. IV abx  given in ER ( zosyn, rocephin, vanco)          MEDICATIONS  (STANDING):  aspirin enteric coated 81milliGRAM(s) Oral daily  isosorbide   mononitrate ER Tablet (IMDUR) 60milliGRAM(s) Oral daily  gabapentin 300milliGRAM(s) Oral at bedtime  sertraline 50milliGRAM(s) Oral daily  atorvastatin 10milliGRAM(s) Oral at bedtime  fluorometholone 0.1% Suspension 1Drop(s) Both EYES two times a day  Nephro-mynor 1Tablet(s) Oral daily  heparin  Injectable 5000Unit(s) SubCutaneous every 8 hours  doxercalciferol Injectable 1MICROGram(s) IV Push <User Schedule>  carvedilol 12.5milliGRAM(s) Oral every 12 hours    MEDICATIONS  (PRN):  acetaminophen   Tablet 650milliGRAM(s) Oral every 6 hours PRN For Temp greater than 38 C (100.4 F)  acetaminophen   Tablet. 650milliGRAM(s) Oral every 6 hours PRN Mild Pain (1 - 3)  metoclopramide Injectable 10milliGRAM(s) IV Push once PRN Nausea and/or Vomiting  senna 2Tablet(s) Oral at bedtime PRN Constipation  docusate sodium 100milliGRAM(s) Oral three times a day PRN Constipation          Vital Signs Last 24 Hrs  T(C): 36.9, Max: 38.1 (05-10 @ 15:37)  T(F): 98.5, Max: 100.5 (05-10 @ 15:37)  HR: 61 (60 - 98)  BP: 115/41 (101/41 - 166/52)  BP(mean): 64 (59 - 80)  RR: 17 (16 - 27)  SpO2: 88% (88% - 100%)  Daily     Daily Weight in k.5 (11 May 2017 02:00)      PHYSICAL EXAM:  Alert, calmer.    GENERAL: No acute distress  CHEST/LUNG: Right clear.  Left diffuse rhonchi  HEART: S1S2 RRR no rub  ABDOMEN: soft  EXTREMITIES: Left Arm edema   Less erythematous,  RLE with decreased edema  SKIN:     LABS:                        10.3   5.4   )-----------( 142      ( 11 May 2017 05:43 )             33.9     11    137  |  101  |  64<H>  ----------------------------<  133<H>  4.7   |  25  |  5.88<H>    Ca    8.6      11 May 2017 05:43  Phos  4.4       Mg     2.2     05-10    TPro  x   /  Alb  3.0<L>  /  TBili  x   /  DBili  x   /  AST  x   /  ALT  x   /  AlkPhos  x   11    PT/INR - ( 09 May 2017 12:53 )   PT: 11.7 sec;   INR: 1.08 ratio         PTT - ( 09 May 2017 12:53 )  PTT:33.5 sec  Urinalysis Basic - ( 09 May 2017 12:50 )    Color: Yellow / Appearance: very cloudy / S.010 / pH: x  Gluc: x / Ketone: Negative  / Bili: Small / Urobili: Negative mg/dL   Blood: x / Protein: 500 mg/dL / Nitrite: Negative   Leuk Esterase: Moderate / RBC: >50 /HPF / WBC 11-25   Sq Epi: x / Non Sq Epi: Moderate / Bacteria: Moderate      Phosphorus Level, Serum: 4.4 mg/dL ( @ 05:43)          RADIOLOGY & ADDITIONAL TESTS:

## 2017-05-11 NOTE — PROGRESS NOTE ADULT - SUBJECTIVE AND OBJECTIVE BOX
72 y/o F PMHx significant for severe PVD s/p Rt BKA (2016), LUE DVT, cardiac tumor s/p removal , CABG x 2 vessels, ESRD on HD x 3 yrs, AICD/PM, CAD, prior MI, CVA, DM2 who was BIBA from [facility] where she was found to be increasingly lethargic, febrile, with left arm swelling and erythema. The patient was recently evaluated by her vascular surgeon Dr. Awadallah who diagnosed her w/ B/L UE thrombi on 2017.    5/10:  Pt more awake today as per RN but does not give any history.    :  Pt more awake and alert today  venous doppler of both arms NEGATIVE for blood clots and Patent left arm AV fistula      PHYSICAL EXAM:    Daily     Daily Weight in k.9 (11 May 2017 11:38)    ICU Vital Signs Last 24 Hrs  T(C): 36.6, Max: 37.3 ( @ 00:00)  T(F): 97.9, Max: 99.2 ( @ 00:00)  HR: 70 (60 - 88)  BP: 166/41 (67/46 - 166/41)  BP(mean): 64 (59 - 75)  ABP: --  ABP(mean): --  RR: 17 (16 - 25)  SpO2: 88% (88% - 100%)      Constitutional: Weak and Ill appearing  HEENT: Atraumatic, ELLIOT  Respiratory: Breath Sounds normal, no rhonchi/wheeze  Cardiovascular: N S1S2; BOY present  Gastrointestinal: Abdomen soft, non tender, Bowel Sounds present  Extremities: No edema, peripheral pulses present  Neurological: Awake not much alert or oriented, no gross focal motor deficits  Skin: Non cellulitic, no rash, ulcers  Lymph Nodes: No lymphadenopathy noted  Back: No CVA tenderness   Musculoskeletal: non tender  Breasts: Deferred  Genitourinary: deferred  Rectal: Deferred                          10.3   5.4   )-----------( 142      ( 11 May 2017 05:43 )             33.9       CBC Full  -  ( 11 May 2017 05:43 )  WBC Count : 5.4 K/uL  Hemoglobin : 10.3 g/dL  Hematocrit : 33.9 %  Platelet Count - Automated : 142 K/uL  Mean Cell Volume : 99.1 fl  Mean Cell Hemoglobin : 30.2 pg  Mean Cell Hemoglobin Concentration : 30.5 gm/dL  Auto Neutrophil # : x  Auto Lymphocyte # : x  Auto Monocyte # : x  Auto Eosinophil # : x  Auto Basophil # : x  Auto Neutrophil % : x  Auto Lymphocyte % : x  Auto Monocyte % : x  Auto Eosinophil % : x  Auto Basophil % : x      05-    137  |  101  |  64<H>  ----------------------------<  133<H>  4.7   |  25  |  5.88<H>    Ca    8.6      11 May 2017 05:43  Phos  4.4     05-  Mg     2.2     05-10    TPro  x   /  Alb  3.0<L>  /  TBili  x   /  DBili  x   /  AST  x   /  ALT  x   /  AlkPhos  x         LIVER FUNCTIONS - ( 11 May 2017 05:43 )  Alb: 3.0 g/dL / Pro: x     / ALK PHOS: x     / ALT: x     / AST: x     / GGT: x             EXAM:  US DPLX UPR EXT VEINS COMPL BI                          PROCEDURE DATE:  05/10/2017      IMPRESSION:  No sonographic evidence for deep vein thrombosis in the   right and left upper extremity. Patent left brachiobasilic fistula graft.      MEDICATIONS  (STANDING):  aspirin enteric coated 81milliGRAM(s) Oral daily  isosorbide   mononitrate ER Tablet (IMDUR) 60milliGRAM(s) Oral daily  gabapentin 300milliGRAM(s) Oral at bedtime  sertraline 50milliGRAM(s) Oral daily  atorvastatin 10milliGRAM(s) Oral at bedtime  fluorometholone 0.1% Suspension 1Drop(s) Both EYES two times a day  Nephro-mynor 1Tablet(s) Oral daily  heparin  Injectable 5000Unit(s) SubCutaneous every 8 hours  doxercalciferol Injectable 1MICROGram(s) IV Push <User Schedule>  carvedilol 12.5milliGRAM(s) Oral every 12 hours  epoetin hazel Injectable 8000Unit(s) IV Push <User Schedule>

## 2017-05-11 NOTE — PROGRESS NOTE ADULT - ASSESSMENT
70 y/o F PMHx significant for severe PVD s/p Rt BKA (9/2016), LUE DVT, cardiac tumor s/p removal 1990s, CABG x 2 vessels, ESRD on HD x 3 yrs, AICD/PM, CAD, prior MI, CVA, DM2 now admitted on 5/9 from snf for evaluation of increased lethargy, fever and left upper extremity swelling and redness; subsequently found to have bilateral upper extremity thrombi; her dialysis access is in the left upper extremity and outpatient eval for AVF salvage was unsuccessful. Patient is lethargic and history per medical record.  1. Patient admitted with sepsis secondary to probable infected vascular access  - follow up cultures   - oxygen and nebs as needed  - would continue cefepime as ordered, day #2  - tolerating antibiotics without rashes or side effects   - will add vancomycin to treat resistant bacteria but intermittently dosed given renal function, received one gram on 5/9  - vascular evaluation  - icu and supportive care  - consider palliative evaluation  2. other issues: severe PVD s/p Rt BKA (9/2016), LUE DVT, cardiac tumor s/p removal 1990s, CABG x 2 vessels, ESRD on HD x 3 yrs, AICD/PM, CAD, prior MI, CVA, DM2   - per medicine

## 2017-05-11 NOTE — DIETITIAN INITIAL EVALUATION ADULT. - ENERGY NEEDS
HT:  64Inches   WT: 68Kg   BMI:  25.7Kg/m2   IBW:  51.8Kg   %IBW:  132%  Adjusted:  55.9Kg (with BKA)

## 2017-05-11 NOTE — DIETITIAN INITIAL EVALUATION ADULT. - ADHERENCE
Patient resides at [facility] where she is on a renal, consistent carbohydrate, 1000 cc fluid restriction diet/n/a

## 2017-05-12 LAB
ADD ON TEST-SPECIMEN IN LAB: SIGNIFICANT CHANGE UP
ANION GAP SERPL CALC-SCNC: 11 MMOL/L — SIGNIFICANT CHANGE UP (ref 5–17)
BLD GP AB SCN SERPL QL: SIGNIFICANT CHANGE UP
BUN SERPL-MCNC: 40 MG/DL — HIGH (ref 7–23)
CALCIUM SERPL-MCNC: 8.6 MG/DL — SIGNIFICANT CHANGE UP (ref 8.5–10.1)
CHLORIDE SERPL-SCNC: 100 MMOL/L — SIGNIFICANT CHANGE UP (ref 96–108)
CO2 SERPL-SCNC: 25 MMOL/L — SIGNIFICANT CHANGE UP (ref 22–31)
CREAT SERPL-MCNC: 4.46 MG/DL — HIGH (ref 0.5–1.3)
GLUCOSE SERPL-MCNC: 212 MG/DL — HIGH (ref 70–99)
HCT VFR BLD CALC: 32.8 % — LOW (ref 34.5–45)
HGB BLD-MCNC: 10.6 G/DL — LOW (ref 11.5–15.5)
MCHC RBC-ENTMCNC: 31.1 PG — SIGNIFICANT CHANGE UP (ref 27–34)
MCHC RBC-ENTMCNC: 32.4 GM/DL — SIGNIFICANT CHANGE UP (ref 32–36)
MCV RBC AUTO: 95.9 FL — SIGNIFICANT CHANGE UP (ref 80–100)
PHOSPHATE SERPL-MCNC: 3.8 MG/DL — SIGNIFICANT CHANGE UP (ref 2.5–4.5)
PLATELET # BLD AUTO: 142 K/UL — LOW (ref 150–400)
POTASSIUM SERPL-MCNC: 4.1 MMOL/L — SIGNIFICANT CHANGE UP (ref 3.5–5.3)
POTASSIUM SERPL-SCNC: 4.1 MMOL/L — SIGNIFICANT CHANGE UP (ref 3.5–5.3)
RBC # BLD: 3.42 M/UL — LOW (ref 3.8–5.2)
RBC # FLD: 15.2 % — HIGH (ref 10.3–14.5)
SODIUM SERPL-SCNC: 136 MMOL/L — SIGNIFICANT CHANGE UP (ref 135–145)
TYPE + AB SCN PNL BLD: SIGNIFICANT CHANGE UP
WBC # BLD: 5.9 K/UL — SIGNIFICANT CHANGE UP (ref 3.8–10.5)
WBC # FLD AUTO: 5.9 K/UL — SIGNIFICANT CHANGE UP (ref 3.8–10.5)

## 2017-05-12 RX ORDER — DOXERCALCIFEROL 2.5 UG/1
1 CAPSULE ORAL ONCE
Qty: 0 | Refills: 0 | Status: CANCELLED | OUTPATIENT
Start: 2018-04-10 | End: 2017-05-19

## 2017-05-12 RX ORDER — ERYTHROPOIETIN 10000 [IU]/ML
8000 INJECTION, SOLUTION INTRAVENOUS; SUBCUTANEOUS ONCE
Qty: 0 | Refills: 0 | Status: COMPLETED | OUTPATIENT
Start: 2017-05-12 | End: 2017-05-12

## 2017-05-12 RX ADMIN — GABAPENTIN 300 MILLIGRAM(S): 400 CAPSULE ORAL at 22:01

## 2017-05-12 RX ADMIN — SERTRALINE 50 MILLIGRAM(S): 25 TABLET, FILM COATED ORAL at 15:18

## 2017-05-12 RX ADMIN — ATORVASTATIN CALCIUM 10 MILLIGRAM(S): 80 TABLET, FILM COATED ORAL at 22:01

## 2017-05-12 RX ADMIN — HEPARIN SODIUM 5000 UNIT(S): 5000 INJECTION INTRAVENOUS; SUBCUTANEOUS at 15:19

## 2017-05-12 RX ADMIN — CARVEDILOL PHOSPHATE 12.5 MILLIGRAM(S): 80 CAPSULE, EXTENDED RELEASE ORAL at 05:37

## 2017-05-12 RX ADMIN — Medication 100 MILLIGRAM(S): at 15:18

## 2017-05-12 RX ADMIN — FLUOROMETHOLONE 1 DROP(S): 1 SOLUTION/ DROPS OPHTHALMIC at 18:36

## 2017-05-12 RX ADMIN — ISOSORBIDE MONONITRATE 60 MILLIGRAM(S): 60 TABLET, EXTENDED RELEASE ORAL at 15:18

## 2017-05-12 RX ADMIN — FLUOROMETHOLONE 1 DROP(S): 1 SOLUTION/ DROPS OPHTHALMIC at 06:53

## 2017-05-12 RX ADMIN — DOXERCALCIFEROL 1 MICROGRAM(S): 2.5 CAPSULE ORAL at 13:06

## 2017-05-12 RX ADMIN — CARVEDILOL PHOSPHATE 12.5 MILLIGRAM(S): 80 CAPSULE, EXTENDED RELEASE ORAL at 18:36

## 2017-05-12 RX ADMIN — HEPARIN SODIUM 5000 UNIT(S): 5000 INJECTION INTRAVENOUS; SUBCUTANEOUS at 22:00

## 2017-05-12 RX ADMIN — Medication 81 MILLIGRAM(S): at 15:18

## 2017-05-12 RX ADMIN — ERYTHROPOIETIN 8000 UNIT(S): 10000 INJECTION, SOLUTION INTRAVENOUS; SUBCUTANEOUS at 13:02

## 2017-05-12 NOTE — PROGRESS NOTE ADULT - SUBJECTIVE AND OBJECTIVE BOX
72 y/o F PMHx significant for severe PVD s/p Rt BKA (9/2016), LUE DVT, cardiac tumor s/p removal 1990s, CABG x 2 vessels, ESRD on HD x 3 yrs, AICD/PM, CAD, prior MI, CVA, DM2 who was BIBA from [facility] where she was found to be increasingly lethargic, febrile, with left arm swelling and erythema. The patient was recently evaluated by her vascular surgeon Dr. Awadallah who diagnosed her w/ B/L UE thrombi on 5/4/2017.    5/10:  Pt more awake today as per RN but does not give any history.    5/11:  Pt more awake and alert today  venous doppler of both arms NEGATIVE for blood clots and Patent left arm AV fistula    5/12:  more awake alert today      PHYSICAL EXAM:    Daily     Daily     ICU Vital Signs Last 24 Hrs  T(C): 36.7, Max: 37.8 (05-11 @ 16:57)  T(F): 98, Max: 100.1 (05-11 @ 16:57)  HR: 64 (57 - 75)  BP: 127/57 (87/57 - 179/66)  BP(mean): 77 (77 - 79)  ABP: --  ABP(mean): --  RR: 16 (13 - 19)  SpO2: 98% (92% - 100%)      Constitutional: Weak appearing  HEENT: Atraumatic, SILVERIO, Normal, No congestion  Respiratory: Breath Sounds normal, no rhonchi/wheeze  Cardiovascular: N S1S2; BOY present  Gastrointestinal: Abdomen soft, non tender, Bowel Sounds present  Extremities: No edema, peripheral pulses present  Neurological: AAO x 2, no gross focal motor deficits  Skin: Non cellulitic, no rash, ulcers  Lymph Nodes: No lymphadenopathy noted  Back: No CVA tenderness   Musculoskeletal: non tender  Breasts: Deferred  Genitourinary: deferred  Rectal: Deferred                          10.6   5.9   )-----------( 142      ( 12 May 2017 09:15 )             32.8       CBC Full  -  ( 12 May 2017 09:15 )  WBC Count : 5.9 K/uL  Hemoglobin : 10.6 g/dL  Hematocrit : 32.8 %  Platelet Count - Automated : 142 K/uL  Mean Cell Volume : 95.9 fl  Mean Cell Hemoglobin : 31.1 pg  Mean Cell Hemoglobin Concentration : 32.4 gm/dL  Auto Neutrophil # : x  Auto Lymphocyte # : x  Auto Monocyte # : x  Auto Eosinophil # : x  Auto Basophil # : x  Auto Neutrophil % : x  Auto Lymphocyte % : x  Auto Monocyte % : x  Auto Eosinophil % : x  Auto Basophil % : x      05-12    136  |  100  |  40<H>  ----------------------------<  212<H>  4.1   |  25  |  4.46<H>    Ca    8.6      12 May 2017 09:15  Phos  3.8     05-12    TPro  x   /  Alb  3.0<L>  /  TBili  x   /  DBili  x   /  AST  x   /  ALT  x   /  AlkPhos  x   05-11      LIVER FUNCTIONS - ( 11 May 2017 05:43 )  Alb: 3.0 g/dL / Pro: x     / ALK PHOS: x     / ALT: x     / AST: x     / GGT: x                               MEDICATIONS  (STANDING):  aspirin enteric coated 81milliGRAM(s) Oral daily  isosorbide   mononitrate ER Tablet (IMDUR) 60milliGRAM(s) Oral daily  gabapentin 300milliGRAM(s) Oral at bedtime  sertraline 50milliGRAM(s) Oral daily  atorvastatin 10milliGRAM(s) Oral at bedtime  fluorometholone 0.1% Suspension 1Drop(s) Both EYES two times a day  Nephro-mynor 1Tablet(s) Oral daily  heparin  Injectable 5000Unit(s) SubCutaneous every 8 hours  doxercalciferol Injectable 1MICROGram(s) IV Push <User Schedule>  carvedilol 12.5milliGRAM(s) Oral every 12 hours  epoetin hazel Injectable 8000Unit(s) IV Push <User Schedule>

## 2017-05-12 NOTE — PROGRESS NOTE ADULT - ASSESSMENT
71/F , DNR, admitted with     1) AMS/Metabolic encephalopathy d/t cellulitis + Left arm cellulitis + Infected Left arm AVF: Stable hemodynamically  transfer to medical floor  cont cefepime and vanco as per ID  f/u Blood cx    2) ?Clotted Left AVF + ESRD on HD: Patent  left AVF on doppler  appreciate Vascular Sx and renal consults  being dialyzed thru temp access in right groin  would do Venous doppler of b/l upper ext as advised by vascular sx.: patent Left AVF  would need an access for HD for long term: RIJ cath placement and ligation of Left AVFon 5/13 by Dr. Morin    3) HTN:  cont amlodipine, coreg    4) DM 2:  ISS    5) Diet:  start pureed diet with aspiration precautions    6) CAD:  cont asa, statins, coreg    poc discussed with team

## 2017-05-12 NOTE — PROGRESS NOTE ADULT - ASSESSMENT
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  Pt has L UE avf, same side of AICD, with clotted UE bilaterally.. Pt recently seen by her surgeon Dr awadallah who dx her w bilat UE clots, she was also seen at [facility] yesterday, I spoke to Dr hearn.. He stated he could not get a through the clots adjacent to the PM wire to de clot the vein. he suggested ligating the avf may reduce the swelling..  Today arm is erythematous and pt is febrile, lethargic. IV abx  given in ER ( zosyn, rocephin, vanco)    D/W pts daughter, will place femoral line and dialyze today  ID consult, Dr Carrasco informed, suggested to place on Cefepime 1 g iv daily starting in am, after hd on hd days..  Dr Morin consulted for second opinion of av access..  lactate level w HD    5/10 SY  --ESRD  Continue TIW HD   Will plan HD 5/11 and 5/12 with plans to remove temporary femoral catheter post tx on 5/12.  Will plan permanent access depending on results of blood cultures.  ID and Vascular follow up for permanent access and ligation of Left UE AVF.  --AMS  likely due to sepsis.  Continue to follow closely  --PVC : check repeat CXR.    5/11 SY  --ESRD   Tolerating tx fairly ok.  HD order and tx reviewed.  Will plan HD again in am with plans to remove femoral catheter post HD tomorrow.  D/W Dr William.  --HD access.   Plan for Permcath on monday for now.   Awaiting call back from Dr Peters re further plans for current AVF ligation and creation of a new AVF possibly.  --ID  : continue abtx for LUE cellulitis.  --AMS improving  --PVC  : follow up repeat CXR.  Will attempt to increase UF with HD.  BP borderline on HD today.   D/c Amlodipine and decrease dose of Coreg.    5/12 MK  - ESRD tolerating HD goal uf of 1 kg.  for removal of dialysis catheter post HD. Bp more stable with removal of HD  - LUE cellulitits: abx as per ID.    - DEEPTI AVF:for ligation and Perm cath placement in AM  - Palliative input noted

## 2017-05-12 NOTE — PROGRESS NOTE ADULT - SUBJECTIVE AND OBJECTIVE BOX
Removal of RIGHT Femoral CVL/dialysis cath    with pt in supine position, RIGHT CVL removed.      Site cleaned and hemostasis achieved.    Dry gauze dressing applied.    Pt returned to original upright position.    Tolerated without incident.

## 2017-05-12 NOTE — PROGRESS NOTE ADULT - SUBJECTIVE AND OBJECTIVE BOX
NEPHROLOGY INTERVAL HPI/OVERNIGHT EVENTS:  doing well, alert, awake oriented to person and place   very upset about the events and the possibility of new   Perm cath    MEDICATIONS  (STANDING):  aspirin enteric coated 81milliGRAM(s) Oral daily  isosorbide   mononitrate ER Tablet (IMDUR) 60milliGRAM(s) Oral daily  gabapentin 300milliGRAM(s) Oral at bedtime  sertraline 50milliGRAM(s) Oral daily  atorvastatin 10milliGRAM(s) Oral at bedtime  fluorometholone 0.1% Suspension 1Drop(s) Both EYES two times a day  Nephro-mynor 1Tablet(s) Oral daily  heparin  Injectable 5000Unit(s) SubCutaneous every 8 hours  doxercalciferol Injectable 1MICROGram(s) IV Push <User Schedule>  carvedilol 12.5milliGRAM(s) Oral every 12 hours  epoetin hazel Injectable 8000Unit(s) IV Push <User Schedule>    MEDICATIONS  (PRN):  acetaminophen   Tablet 650milliGRAM(s) Oral every 6 hours PRN For Temp greater than 38 C (100.4 F)  acetaminophen   Tablet. 650milliGRAM(s) Oral every 6 hours PRN Mild Pain (1 - 3)  metoclopramide Injectable 10milliGRAM(s) IV Push once PRN Nausea and/or Vomiting  senna 2Tablet(s) Oral at bedtime PRN Constipation  docusate sodium 100milliGRAM(s) Oral three times a day PRN Constipation      Allergies    No Known Allergies    Intolerances        I&O's Detail    I & Os for current day (as of 12 May 2017 09:48)  =============================================  IN:    Oral Fluid: 100 ml    Total IN: 100 ml  ---------------------------------------------  OUT:    Total OUT: 0 ml  ---------------------------------------------  Total NET: 100 ml      .    Patient was seen and evaluated on dialysis.   Patient is tolerating the procedure well.   Continue dialysis:     BFR of 400 with goal uf fo 1 kg on 2k     Vital Signs Last 24 Hrs  T(C): 36.4, Max: 37.8 ( @ 16:57)  T(F): 97.6, Max: 100.1 ( @ 16:57)  HR: 63 (61 - 75)  BP: 132/71 (67/46 - 178/46)  BP(mean): 77 (71 - 88)  RR: 18 (13 - 21)  SpO2: 98% (92% - 100%)  Daily     Daily Weight in k.9 (11 May 2017 11:38)    PHYSICAL EXAM:  General: alert. awake Ox3  HEENT: MMM  CV: s1s2 rrr  LUNGS: B/L CTA  EXT: no edema    LABS:                        10.6   5.9   )-----------( 142      ( 12 May 2017 09:15 )             32.8         137  |  101  |  64<H>  ----------------------------<  133<H>  4.7   |  25  |  5.88<H>    Ca    8.6      11 May 2017 05:43  Phos  3.8         TPro  x   /  Alb  3.0<L>  /  TBili  x   /  DBili  x   /  AST  x   /  ALT  x   /  AlkPhos  x           Phosphorus Level, Serum: 3.8 mg/dL ( @ 09:15)

## 2017-05-13 LAB
ANION GAP SERPL CALC-SCNC: 13 MMOL/L — SIGNIFICANT CHANGE UP (ref 5–17)
BUN SERPL-MCNC: 30 MG/DL — HIGH (ref 7–23)
CALCIUM SERPL-MCNC: 8.7 MG/DL — SIGNIFICANT CHANGE UP (ref 8.5–10.1)
CHLORIDE SERPL-SCNC: 103 MMOL/L — SIGNIFICANT CHANGE UP (ref 96–108)
CO2 SERPL-SCNC: 24 MMOL/L — SIGNIFICANT CHANGE UP (ref 22–31)
CREAT SERPL-MCNC: 3.64 MG/DL — HIGH (ref 0.5–1.3)
GLUCOSE SERPL-MCNC: 160 MG/DL — HIGH (ref 70–99)
POTASSIUM SERPL-MCNC: 3.6 MMOL/L — SIGNIFICANT CHANGE UP (ref 3.5–5.3)
POTASSIUM SERPL-SCNC: 3.6 MMOL/L — SIGNIFICANT CHANGE UP (ref 3.5–5.3)
SODIUM SERPL-SCNC: 140 MMOL/L — SIGNIFICANT CHANGE UP (ref 135–145)

## 2017-05-13 PROCEDURE — 71010: CPT | Mod: 26

## 2017-05-13 RX ORDER — OXYCODONE HYDROCHLORIDE 5 MG/1
5 TABLET ORAL EVERY 4 HOURS
Qty: 0 | Refills: 0 | Status: DISCONTINUED | OUTPATIENT
Start: 2017-05-13 | End: 2017-05-13

## 2017-05-13 RX ORDER — OXYCODONE HYDROCHLORIDE 5 MG/1
2.5 TABLET ORAL ONCE
Qty: 0 | Refills: 0 | Status: DISCONTINUED | OUTPATIENT
Start: 2017-05-13 | End: 2017-05-14

## 2017-05-13 RX ORDER — HYDRALAZINE HCL 50 MG
10 TABLET ORAL ONCE
Qty: 0 | Refills: 0 | Status: COMPLETED | OUTPATIENT
Start: 2017-05-13 | End: 2017-05-13

## 2017-05-13 RX ORDER — FENTANYL CITRATE 50 UG/ML
25 INJECTION INTRAVENOUS
Qty: 0 | Refills: 0 | Status: DISCONTINUED | OUTPATIENT
Start: 2017-05-13 | End: 2017-05-13

## 2017-05-13 RX ORDER — ACETAMINOPHEN 500 MG
1000 TABLET ORAL ONCE
Qty: 0 | Refills: 0 | Status: COMPLETED | OUTPATIENT
Start: 2017-05-13 | End: 2017-05-13

## 2017-05-13 RX ORDER — SODIUM CHLORIDE 9 MG/ML
1000 INJECTION INTRAMUSCULAR; INTRAVENOUS; SUBCUTANEOUS
Qty: 0 | Refills: 0 | Status: DISCONTINUED | OUTPATIENT
Start: 2017-05-13 | End: 2017-05-13

## 2017-05-13 RX ORDER — ACETAMINOPHEN 500 MG
975 TABLET ORAL ONCE
Qty: 0 | Refills: 0 | Status: COMPLETED | OUTPATIENT
Start: 2017-05-13 | End: 2017-05-13

## 2017-05-13 RX ORDER — ONDANSETRON 8 MG/1
4 TABLET, FILM COATED ORAL ONCE
Qty: 0 | Refills: 0 | Status: DISCONTINUED | OUTPATIENT
Start: 2017-05-13 | End: 2017-05-13

## 2017-05-13 RX ADMIN — Medication 400 MILLIGRAM(S): at 10:27

## 2017-05-13 RX ADMIN — GABAPENTIN 300 MILLIGRAM(S): 400 CAPSULE ORAL at 22:19

## 2017-05-13 RX ADMIN — FLUOROMETHOLONE 1 DROP(S): 1 SOLUTION/ DROPS OPHTHALMIC at 05:55

## 2017-05-13 RX ADMIN — FLUOROMETHOLONE 1 DROP(S): 1 SOLUTION/ DROPS OPHTHALMIC at 16:13

## 2017-05-13 RX ADMIN — ATORVASTATIN CALCIUM 10 MILLIGRAM(S): 80 TABLET, FILM COATED ORAL at 22:19

## 2017-05-13 RX ADMIN — Medication 650 MILLIGRAM(S): at 16:30

## 2017-05-13 RX ADMIN — HEPARIN SODIUM 5000 UNIT(S): 5000 INJECTION INTRAVENOUS; SUBCUTANEOUS at 22:19

## 2017-05-13 RX ADMIN — CARVEDILOL PHOSPHATE 12.5 MILLIGRAM(S): 80 CAPSULE, EXTENDED RELEASE ORAL at 05:56

## 2017-05-13 RX ADMIN — Medication 10 MILLIGRAM(S): at 17:40

## 2017-05-13 RX ADMIN — Medication 81 MILLIGRAM(S): at 11:12

## 2017-05-13 RX ADMIN — SERTRALINE 50 MILLIGRAM(S): 25 TABLET, FILM COATED ORAL at 11:13

## 2017-05-13 RX ADMIN — Medication 975 MILLIGRAM(S): at 22:25

## 2017-05-13 RX ADMIN — ISOSORBIDE MONONITRATE 60 MILLIGRAM(S): 60 TABLET, EXTENDED RELEASE ORAL at 11:12

## 2017-05-13 RX ADMIN — Medication 650 MILLIGRAM(S): at 15:55

## 2017-05-13 RX ADMIN — Medication 1000 MILLIGRAM(S): at 11:00

## 2017-05-13 RX ADMIN — HEPARIN SODIUM 5000 UNIT(S): 5000 INJECTION INTRAVENOUS; SUBCUTANEOUS at 05:56

## 2017-05-13 RX ADMIN — HEPARIN SODIUM 5000 UNIT(S): 5000 INJECTION INTRAVENOUS; SUBCUTANEOUS at 15:53

## 2017-05-13 RX ADMIN — CARVEDILOL PHOSPHATE 12.5 MILLIGRAM(S): 80 CAPSULE, EXTENDED RELEASE ORAL at 16:12

## 2017-05-13 RX ADMIN — Medication 1 CAPSULE(S): at 11:21

## 2017-05-13 NOTE — PROGRESS NOTE ADULT - SUBJECTIVE AND OBJECTIVE BOX
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 The patient was recently evaluated by her vascular surgeon Dr. Awadallah who diagnosed her w/ B/L UE thrombi on 5/4/2017 (of note the patients' last HD session was on Saturday). The patient was evaluated yesterday at [facility]. Per Renal attempt at AVF salvage was unsuccessful. Today the patient was referred to the ED from [facility] as the patient was noted to be increasingly lethargic, febrile, and had left arm swelling and erythema. In the ED the patient was given Ceftriaxone 1g IV x 1, Zosyn 3.375g IV x 1, and Vancomycin 1g IV x 1. The patient was admitted to the CCU for emergent dialysis and shiley catheter placement. Renal, Vascular Surgery, and ID were consulted in the ED. (09 May 2017 18:07)    5/13  s/p ligation of lft avf and placement of R IJ CVC  pt due for hd monday      PAST MEDICAL & SURGICAL HISTORY:  AICD (automatic cardioverter/defibrillator) present  AVF (arteriovenous fistula): Left  DM2 (diabetes mellitus, type 2)  CVA (cerebral vascular accident)  MI (myocardial infarction)  CAD (coronary artery disease)  ESRD on hemodialysis  Cervical carcinoma  No pertinent past medical history  H/O bilateral oophorectomy  S/P BKA (below knee amputation) unilateral, right  S/P CABG (coronary artery bypass graft)  No significant past surgical history      FAMILY HISTORY:  No pertinent family history in first degree relatives      MEDICATIONS  (STANDING):  aspirin enteric coated 81milliGRAM(s) Oral daily  isosorbide   mononitrate ER Tablet (IMDUR) 60milliGRAM(s) Oral daily  gabapentin 300milliGRAM(s) Oral at bedtime  sertraline 50milliGRAM(s) Oral daily  atorvastatin 10milliGRAM(s) Oral at bedtime  fluorometholone 0.1% Suspension 1Drop(s) Both EYES two times a day  heparin  Injectable 5000Unit(s) SubCutaneous every 8 hours  doxercalciferol Injectable 1MICROGram(s) IV Push <User Schedule>  carvedilol 12.5milliGRAM(s) Oral every 12 hours  epoetin hazel Injectable 8000Unit(s) IV Push <User Schedule>  Nephrocaps 1Capsule(s) Oral daily    MEDICATIONS  (PRN):  acetaminophen   Tablet 650milliGRAM(s) Oral every 6 hours PRN For Temp greater than 38 C (100.4 F)  acetaminophen   Tablet. 650milliGRAM(s) Oral every 6 hours PRN Mild Pain (1 - 3)  metoclopramide Injectable 10milliGRAM(s) IV Push once PRN Nausea and/or Vomiting  senna 2Tablet(s) Oral at bedtime PRN Constipation  docusate sodium 100milliGRAM(s) Oral three times a day PRN Constipation      Allergies    No Known Allergies    Intolerances        I&O's Summary    I & Os for current day (as of 13 May 2017 14:23)  =============================================  IN: 250 ml / OUT: 0 ml / NET: 250 ml        REVIEW OF SYSTEMS:    CONSTITUTIONAL:  As per HPI.    HEENT:  Eyes:  No diplopia or blurred vision. ENT:  No earache, sore throat or runny nose.    CARDIOVASCULAR:  No pressure, squeezing, strangling, tightness, heaviness or aching about the chest, neck, axilla or epigastrium.    RESPIRATORY:  No cough, shortness of breath, PND or orthopnea.    GASTROINTESTINAL:  No nausea, vomiting or diarrhea.    GENITOURINARY:  No dysuria, frequency or urgency.    MUSCULOSKELETAL:  As per HPI.    SKIN:  No change in skin, hair or nails.    NEUROLOGIC:  No paresthesias, fasciculations, seizures or weakness.    PSYCHIATRIC:  No disorder of thought or mood.    ENDOCRINE:  No heat or cold intolerance, polyuria or polydipsia.    HEMATOLOGICAL:  No easy bruising or bleeding.      Vital Signs Last 24 Hrs  T(C): 36.5, Max: 37.5 (05-12 @ 22:00)  T(F): 97.7, Max: 99.5 (05-12 @ 22:00)  HR: 57 (55 - 72)  BP: 179/53 (127/57 - 179/53)  BP(mean): --  RR: 20 (11 - 20)  SpO2: 97% (97% - 100%)  Daily     Daily     PHYSICAL EXAM:    General:  Alert, well-developed ,No acute distress.    Neuro:  Alert and oriented to person, place, and time. Able to communicate  well. Cranial nerves 2-12 grossly intact. 5/5 strength in all  extremities bilaterally. Sensation intact in all extremities.  Appropriate affect.     HEENT:  No JVD, no masses, Eyes anicteric, No carotid bruits.No lymphadenopathy,    Cardiovascular:  Regular rate and rhythm, with normal S1 and S2. No murmurs, rubs,  or gallops. No JVD.    Lungs:  Lungs clear. no rales, no wheezing, .    Abdomen:  Normoactive bowel sounds. Soft, flat, non-tender, and non-distended.  No hepatosplenomegaly, positive bowel sounds    Skin:  Warm, dry, well-perfused. No rashes or other lesions.     Extremities:  R BKA    LABS:                        10.6   5.9   )-----------( 142      ( 12 May 2017 09:15 )             32.8     05-13    140  |  103  |  30<H>  ----------------------------<  160<H>  3.6   |  24  |  3.64<H>    Ca    8.7      13 May 2017 06:54  Phos  3.8     05-12

## 2017-05-13 NOTE — PROGRESS NOTE ADULT - SUBJECTIVE AND OBJECTIVE BOX
Called by RN as patient was reporting 10/10 severe occipital pain right and SBP 170s-190s   patient reports pain localized to right occipital region and anterior chest which she attributes to recent catheter placement   VS: HR: 61 BP: 177/74   Exam:   Gen: no acute distress   HEENT: Right sided dialysis catheter placed, No appreciable ecchymosis or signs of trauma in the occipital region, non tender to palpation  CVS: S1 S2 regular   NEURO: non focal neuro exam, visual acuity diminished in Left eye     Assessment: 71 year old female Called by RN as patient was reporting 10/10 severe occipital pain right and SBP 170s-190s   patient reports pain localized to right occipital region and anterior chest which she attributes to recent catheter placement   VS: HR: 61 BP: 177/74   Exam:   Gen: no acute distress   HEENT: Right sided dialysis catheter placed, No appreciable ecchymosis or signs of trauma in the occipital region, non tender to palpation  CVS: S1 S2 regular   EXT: Left  upper extremity edema with mild erythema   NEURO: non focal neuro exam, visual acuity diminished in Left eye     Assessment: 71 year old female with history of  CABG x 2 vessels, HD x 3 yrs, PVD, R bka 9/2016, AICD/PM, x 4 yrs, MI, CVA, DM here for malfunctioning AVF complicated by sepsis now with recent dialysis catheter placement with pain likely associated with catheter placement   In light of the patients renal impairment and severity of pain will give one time dose of fentanyl 12.5mcg to control pain   unable to administer fentanyl on 5E and hence will give 975 Called by RN as patient was reporting 10/10 severe occipital pain right and SBP 170s-190s   patient reports pain localized to right occipital region and anterior chest which she attributes to recent catheter placement   VS: HR: 61 BP: 177/74 repeat 182/57   Exam:   Gen: no acute distress   HEENT: Right sided dialysis catheter placed, No appreciable ecchymosis or signs of trauma in the occipital region, non tender to palpation  CVS: S1 S2 regular   EXT: Left  upper extremity edema with mild erythema   NEURO: non focal neuro exam, visual acuity diminished in Left eye     Assessment: 71 year old female with history of  CABG x 2 vessels, HD x 3 yrs, PVD, R bka 9/2016, AICD/PM, x 4 yrs, MI, CVA, DM here for malfunctioning AVF complicated by sepsis now with recent dialysis catheter placement with pain likely associated with catheter placement   In light of the patients renal impairment and severity of pain will give one time dose of fentanyl 12.5mcg to control pain   unable to administer fentanyl on 5E and hence will give 975mg of tylenol one time dose. Patient has recieved a total of 1650mg prior to this one time dose in the last 24 hours.   If no improvement will give 2.5mg of oxycodone.

## 2017-05-13 NOTE — PROGRESS NOTE ADULT - ASSESSMENT
71/F , DNR, admitted with     1) AMS/Metabolic encephalopathy d/t cellulitis + Left arm cellulitis + Infected Left arm AVF: Stable hemodynamically  transferred to medical floor  OFF ABX as per ID   Blood cx no growth    2) ?Clotted Left AVF + ESRD on HD: Patent  left AVF on doppler  appreciate Vascular Sx and renal consults  being dialyzed thru temp access in right groin  would do Venous doppler of b/l upper ext as advised by vascular sx.: patent Left AVF  would need an access for HD for long term:   s/p RIJ cath placement and ligation of Left AVFon 5/13 by Dr. Morin.  cont HD as per renal    3) HTN:  cont amlodipine, coreg    4) DM 2:  ISS    5) Diet:  renal diet with aspiration precautions    6) CAD:  cont asa, statins, coreg    poc discussed with pt, team

## 2017-05-13 NOTE — PROGRESS NOTE ADULT - SUBJECTIVE AND OBJECTIVE BOX
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 The patient was recently evaluated by her vascular surgeon Dr. Awadallah who diagnosed her w/ B/L UE thrombi on 5/4/2017.    5/10:  Pt more awake today as per RN but does not give any history.    5/11:  Pt more awake and alert today  venous doppler of both arms NEGATIVE for blood clots and Patent left arm AV fistula    5/12:  more awake alert today    5/13:  awake, alert today, asking for regular food  s/p RIJ cath and left SVF ligation      PHYSICAL EXAM:    Daily     Daily     ICU Vital Signs Last 24 Hrs  T(C): 36.5, Max: 37.5 (05-12 @ 22:00)  T(F): 97.7, Max: 99.5 (05-12 @ 22:00)  HR: 57 (55 - 72)  BP: 179/53 (127/57 - 179/53)  BP(mean): --  ABP: --  ABP(mean): --  RR: 20 (11 - 20)  SpO2: 97% (97% - 100%)      Constitutional: Well appearing  HEENT: Atraumatic, SILVERIO, Normal, No congestion  Respiratory: Breath Sounds normal, no rhonchi/wheeze  Cardiovascular: N S1S2; BOY present  Gastrointestinal: Abdomen soft, non tender, Bowel Sounds present  Extremities: No edema, peripheral pulses present  Neurological: AAO x 2, no gross focal motor deficits  Skin: Non cellulitic, no rash, ulcers  Lymph Nodes: No lymphadenopathy noted  Back: No CVA tenderness   Musculoskeletal: non tender  Breasts: Deferred  Genitourinary: deferred  Rectal: Deferred                          10.6   5.9   )-----------( 142      ( 12 May 2017 09:15 )             32.8       CBC Full  -  ( 12 May 2017 09:15 )  WBC Count : 5.9 K/uL  Hemoglobin : 10.6 g/dL  Hematocrit : 32.8 %  Platelet Count - Automated : 142 K/uL  Mean Cell Volume : 95.9 fl  Mean Cell Hemoglobin : 31.1 pg  Mean Cell Hemoglobin Concentration : 32.4 gm/dL  Auto Neutrophil # : x  Auto Lymphocyte # : x  Auto Monocyte # : x  Auto Eosinophil # : x  Auto Basophil # : x  Auto Neutrophil % : x  Auto Lymphocyte % : x  Auto Monocyte % : x  Auto Eosinophil % : x  Auto Basophil % : x      05-13    140  |  103  |  30<H>  ----------------------------<  160<H>  3.6   |  24  |  3.64<H>    Ca    8.7      13 May 2017 06:54  Phos  3.8     05-12                              MEDICATIONS  (STANDING):  aspirin enteric coated 81milliGRAM(s) Oral daily  isosorbide   mononitrate ER Tablet (IMDUR) 60milliGRAM(s) Oral daily  gabapentin 300milliGRAM(s) Oral at bedtime  sertraline 50milliGRAM(s) Oral daily  atorvastatin 10milliGRAM(s) Oral at bedtime  fluorometholone 0.1% Suspension 1Drop(s) Both EYES two times a day  heparin  Injectable 5000Unit(s) SubCutaneous every 8 hours  doxercalciferol Injectable 1MICROGram(s) IV Push <User Schedule>  carvedilol 12.5milliGRAM(s) Oral every 12 hours  epoetin hazel Injectable 8000Unit(s) IV Push <User Schedule>  sodium chloride 0.9%. 1000milliLiter(s) IV Continuous <Continuous>  Nephrocaps 1Capsule(s) Oral daily

## 2017-05-13 NOTE — PROGRESS NOTE ADULT - SUBJECTIVE AND OBJECTIVE BOX
HPI: Pt seen and examined for follow up of [facility]. Pt very lucid this afternoon, able to fully discuss why she is here, knowing she had infection and altered mental status and that her daughter was called by Richar agreeing to have her sent in. She understands that her AVF is not working, upset about needing temporary access, but understanding the reason why. She was able to chronicle her history through amputation, no longer being able to be cared for at home  by , and move to [facility]. She notes her QOL has suffered greatly since her amputation, noting her only wish is that she could have her mobility and independence back, but knowing this is no longer possible. She is also contemplative about stopping dialysis. When asked if she understands what this would mean she says clearly that she would die. She denies feeling depressed, but rather that dialysis has become cumbersome and she has been through so much already and that this is her decision. She notes discussing this with her daughter, but not her , saying her daughter shared that the family would not like this but that they would respect it. Shared with pt that family meeting was scheduled with her  for tomorrow and encouraged her to share this with both him and her doctors. We also talked about code status and she noted she would not want resuscitation or intubation. Discussed this with nursing and pt and agreed that since her mentation fluctuated already once today, that since this is such an important decision we would wait until tomorrow to also confirm it with her  tomorrow. She was ok with this.     She noted mild occipital headache, wanting Tylenol for this, saying she has it from time to time and Tylenol is helpful. She has no other complaints, otherwise feeling well.       PAIN: yes  Location: occipital region of head  Intensity- mild  Quality- aching  Aggravating Factors- none  Alleviating Factors- none  Timing- intermittent    DYSPNEA: denies      ROS:    Anxiety- denies  Depression- denies  Physical Discomfort- denies  Constipation- denies  Diarrhea- denies  Nausea- denies  Vomiting- denies  Anorexia- denies, endorses hunger and happiness with recent liberalized diet  Cough- much improved, with less secretions  Fatigue- yes  Weakness- yes    All other systems reviewed and negative      PHYSICAL EXAM:    Vital Signs Last 24 Hrs  T(C): 36.5, Max: 37.5 (05-12 @ 22:00)  T(F): 97.7, Max: 99.5 (05-12 @ 22:00)  HR: 57 (55 - 72)  BP: 179/53 (136/40 - 179/53)  BP(mean): --  RR: 20 (11 - 20)  SpO2: 97% (97% - 100%)  Daily       PPSV2: 30  %  FAST:    General: older female, appears older than age, pleasant, oriented and fully conversive  Mental Status: AOx3, fully oriented  HEENT: dmm, perrl, eomi  Lungs: dec at bases bl  Cardiac: +s1 s2 rrr  GI: soft nt nd +bs  : diaper in place  Ext: +improved swelling in LUE with erythema, no ttp, dilated  superficial veins above clavicular line, rt bka  Neuro: moves extremities no focal deficits    LABS:                        10.6   5.9   )-----------( 142      ( 12 May 2017 09:15 )             32.8     05-13    140  |  103  |  30<H>  ----------------------------<  160<H>  3.6   |  24  |  3.64<H>    Ca    8.7      13 May 2017 06:54  Phos  3.8     05-12        Albumin: Albumin, Serum: 3.0 g/dL (05-11 @ 05:43)      Allergies    No Known Allergies    Intolerances      MEDICATIONS  (STANDING):  aspirin enteric coated 81milliGRAM(s) Oral daily  isosorbide   mononitrate ER Tablet (IMDUR) 60milliGRAM(s) Oral daily  gabapentin 300milliGRAM(s) Oral at bedtime  sertraline 50milliGRAM(s) Oral daily  atorvastatin 10milliGRAM(s) Oral at bedtime  fluorometholone 0.1% Suspension 1Drop(s) Both EYES two times a day  heparin  Injectable 5000Unit(s) SubCutaneous every 8 hours  doxercalciferol Injectable 1MICROGram(s) IV Push <User Schedule>  carvedilol 12.5milliGRAM(s) Oral every 12 hours  epoetin hazel Injectable 8000Unit(s) IV Push <User Schedule>  Nephrocaps 1Capsule(s) Oral daily    MEDICATIONS  (PRN):  acetaminophen   Tablet 650milliGRAM(s) Oral every 6 hours PRN For Temp greater than 38 C (100.4 F)  acetaminophen   Tablet. 650milliGRAM(s) Oral every 6 hours PRN Mild Pain (1 - 3)  metoclopramide Injectable 10milliGRAM(s) IV Push once PRN Nausea and/or Vomiting  senna 2Tablet(s) Oral at bedtime PRN Constipation  docusate sodium 100milliGRAM(s) Oral three times a day PRN Constipation      RADIOLOGY:

## 2017-05-13 NOTE — PROGRESS NOTE ADULT - ASSESSMENT
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  Pt has L UE avf, same side of AICD, with clotted UE bilaterally.. Pt recently seen by her surgeon Dr awadallah who dx her w bilat UE clots, she was also seen at [facility] yesterday, I spoke to Dr hearn.. He stated he could not get a through the clots adjacent to the PM wire to de clot the vein. he suggested ligating the avf may reduce the swelling..  Today arm is erythematous and pt is febrile, lethargic. IV abx  given in ER ( zosyn, rocephin, vanco)    D/W pts daughter, will place femoral line and dialyze today  ID consult, Dr Carrasco informed, suggested to place on Cefepime 1 g iv daily starting in am, after hd on hd days..  Dr Morin consulted for second opinion of av access..  lactate level w HD    5/10 SY  --ESRD  Continue TIW HD   Will plan HD 5/11 and 5/12 with plans to remove temporary femoral catheter post tx on 5/12.  Will plan permanent access depending on results of blood cultures.  ID and Vascular follow up for permanent access and ligation of Left UE AVF.  --AMS  likely due to sepsis.  Continue to follow closely    5/11 SY  --ESRD   Tolerating tx fairly ok.  HD order and tx reviewed.  Will plan HD again in am with plans to remove femoral catheter post HD tomorrow.  D/W Dr William.  --HD access.   Plan for Permcath on monday for now.   Awaiting call back from Dr Peters re further plans for current AVF ligation and creation of a new AVF possibly.  --ID  : continue abtx for LUE cellulitis.  --AMS improving  --PVC  : follow up repeat CXR.  Will attempt to increase UF with HD.  BP borderline on HD today.   D/c Amlodipine and decrease dose of Coreg.    5/12 MK  - ESRD tolerating HD goal uf of 1 kg.  for removal of dialysis catheter post HD. Bp more stable with removal of HD  - LUE cellulitits: abx as per ID.    - LUE AVF:for ligation and Perm cath placement in AM  - Palliative input noted    5/13  comfortable eating   no complaints  for hd monday  R IJ cvc in place

## 2017-05-13 NOTE — BRIEF OPERATIVE NOTE - PROCEDURE
Dialysis catheter care  05/13/2017  insertion of R IJ Hemastar dialysis catheter  Active  MARLIN  Ligation and division, fistula, arteriovenous, for repair  05/13/2017  ligation of L brachiobasilic AVF  Active  MARLIN

## 2017-05-13 NOTE — PROGRESS NOTE ADULT - ASSESSMENT
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 Found to have bilateral upper extremity thrombi compromising her dialysis access, s/p outpatient eval for AVF salvage which was unsuccessful. Being treated for resultant cellulitis. Palliative care consulted to help establish GOC.    1) AMS  - likely 2/2 to delirium from underlying infection  - fall and aspiration precautions  - appears to have improved, nursing concerned because some episodes of confusion today, will continue to monitor, but seems to be near baseline    2) Cellulitis/ AVF obstruction  - renal, ID, and surgical notes appreciated  - on IV abx  - with temporary access currently and plan for further intervention Monday    3) ESRD  - nephrology notes appreciated  - tolerating HD  - on HD x3 years    4) Prognosis  - guarded  - likely overall poor given complicated PMHx including ESRD, severe PVD, propensity to clot, immobility, and cardiac disease. Pt contemplative about stopping HD, if she does then would be candidate for hospice, needs to discuss this with loved ones and nephrology    5) GOC/advanced disease  - pt does not have capacity currently for medical decisions  - no HCP on file,  Darrel Aguilar [phone] would serve as surrogate  - full code, but pt now requesting DNR/DNI ok with making this decision with  present  - family meeting arranged for tomorrow at 11am. Will discuss GOC including code status, dialysis, and dispo plan    Thank you for including us in Mrs. Aguilar's care. Will continue to follow with you.    Jocelynn Garcia MD  Palliative Care Attending

## 2017-05-14 LAB
CULTURE RESULTS: SIGNIFICANT CHANGE UP
CULTURE RESULTS: SIGNIFICANT CHANGE UP
SPECIMEN SOURCE: SIGNIFICANT CHANGE UP
SPECIMEN SOURCE: SIGNIFICANT CHANGE UP

## 2017-05-14 RX ORDER — OXYCODONE HYDROCHLORIDE 5 MG/1
5 TABLET ORAL EVERY 6 HOURS
Qty: 0 | Refills: 0 | Status: DISCONTINUED | OUTPATIENT
Start: 2017-05-14 | End: 2017-05-19

## 2017-05-14 RX ORDER — AMLODIPINE BESYLATE 2.5 MG/1
5 TABLET ORAL ONCE
Qty: 0 | Refills: 0 | Status: COMPLETED | OUTPATIENT
Start: 2017-05-14 | End: 2017-05-14

## 2017-05-14 RX ORDER — AMLODIPINE BESYLATE 2.5 MG/1
5 TABLET ORAL DAILY
Qty: 0 | Refills: 0 | Status: DISCONTINUED | OUTPATIENT
Start: 2017-05-14 | End: 2017-05-19

## 2017-05-14 RX ORDER — HYDROMORPHONE HYDROCHLORIDE 2 MG/ML
1 INJECTION INTRAMUSCULAR; INTRAVENOUS; SUBCUTANEOUS
Qty: 0 | Refills: 0 | Status: DISCONTINUED | OUTPATIENT
Start: 2017-05-14 | End: 2017-05-19

## 2017-05-14 RX ORDER — HYDROMORPHONE HYDROCHLORIDE 2 MG/ML
1 INJECTION INTRAMUSCULAR; INTRAVENOUS; SUBCUTANEOUS ONCE
Qty: 0 | Refills: 0 | Status: DISCONTINUED | OUTPATIENT
Start: 2017-05-14 | End: 2017-05-14

## 2017-05-14 RX ORDER — ERYTHROPOIETIN 10000 [IU]/ML
8000 INJECTION, SOLUTION INTRAVENOUS; SUBCUTANEOUS ONCE
Qty: 0 | Refills: 0 | Status: COMPLETED | OUTPATIENT
Start: 2017-05-15 | End: 2017-05-15

## 2017-05-14 RX ADMIN — ISOSORBIDE MONONITRATE 60 MILLIGRAM(S): 60 TABLET, EXTENDED RELEASE ORAL at 11:38

## 2017-05-14 RX ADMIN — Medication 1 CAPSULE(S): at 11:38

## 2017-05-14 RX ADMIN — CARVEDILOL PHOSPHATE 12.5 MILLIGRAM(S): 80 CAPSULE, EXTENDED RELEASE ORAL at 17:52

## 2017-05-14 RX ADMIN — HEPARIN SODIUM 5000 UNIT(S): 5000 INJECTION INTRAVENOUS; SUBCUTANEOUS at 21:02

## 2017-05-14 RX ADMIN — HEPARIN SODIUM 5000 UNIT(S): 5000 INJECTION INTRAVENOUS; SUBCUTANEOUS at 14:11

## 2017-05-14 RX ADMIN — HYDROMORPHONE HYDROCHLORIDE 1 MILLIGRAM(S): 2 INJECTION INTRAMUSCULAR; INTRAVENOUS; SUBCUTANEOUS at 13:23

## 2017-05-14 RX ADMIN — FLUOROMETHOLONE 1 DROP(S): 1 SOLUTION/ DROPS OPHTHALMIC at 17:52

## 2017-05-14 RX ADMIN — AMLODIPINE BESYLATE 5 MILLIGRAM(S): 2.5 TABLET ORAL at 11:37

## 2017-05-14 RX ADMIN — HYDROMORPHONE HYDROCHLORIDE 1 MILLIGRAM(S): 2 INJECTION INTRAMUSCULAR; INTRAVENOUS; SUBCUTANEOUS at 11:34

## 2017-05-14 RX ADMIN — Medication 81 MILLIGRAM(S): at 11:38

## 2017-05-14 RX ADMIN — HEPARIN SODIUM 5000 UNIT(S): 5000 INJECTION INTRAVENOUS; SUBCUTANEOUS at 05:28

## 2017-05-14 RX ADMIN — OXYCODONE HYDROCHLORIDE 2.5 MILLIGRAM(S): 5 TABLET ORAL at 05:27

## 2017-05-14 RX ADMIN — SERTRALINE 50 MILLIGRAM(S): 25 TABLET, FILM COATED ORAL at 11:38

## 2017-05-14 RX ADMIN — ATORVASTATIN CALCIUM 10 MILLIGRAM(S): 80 TABLET, FILM COATED ORAL at 21:02

## 2017-05-14 RX ADMIN — FLUOROMETHOLONE 1 DROP(S): 1 SOLUTION/ DROPS OPHTHALMIC at 08:07

## 2017-05-14 RX ADMIN — GABAPENTIN 300 MILLIGRAM(S): 400 CAPSULE ORAL at 21:02

## 2017-05-14 RX ADMIN — CARVEDILOL PHOSPHATE 12.5 MILLIGRAM(S): 80 CAPSULE, EXTENDED RELEASE ORAL at 05:28

## 2017-05-14 NOTE — PROGRESS NOTE ADULT - SUBJECTIVE AND OBJECTIVE BOX
HPI: Pt seen and examined this am for follow up of sx and GOC, family at bedside for planned family meeting. Pt complaining of persistant pain at hd line site and right occipital region. Unable to fully describe pain, but notes pain was 10/10 overnight not helped with Tylenol, some help with low dose oxycodone (2.5 mg)x1, but this did not last. She denies dizziness, change in vision, or n/v. Otherwise she feels well, at e some breakfast and lunch, but is really plagued by this pain.    Discussed GOC with pt and family decided no more dialysis, DNR/DNI, [facility] home hospice referral, would like dialysis cath removed, see GOC note for more details.       PAIN: yes  Location- right access site up to left occiput  Intensity- mod-severe  Quality- unable to qualify- sharp/ache  Aggravating Factors- none  Alleviating Factors- hot pack, some meds  Timing- continuous    DYSPNEA: denies      ROS:    Anxiety- denies  Depression- denies  Physical Discomfort- yes  Nausea- no  Vomiting- no  Anorexia- no    All other systems reviewed and negative      PHYSICAL EXAM:    Vital Signs Last 24 Hrs  T(C): 36.3, Max: 36.3 (05-14 @ 05:28)  T(F): 97.3, Max: 97.3 (05-14 @ 05:28)  HR: 60 (60 - 68)  BP: 174/59 (167/52 - 182/57)  BP(mean): --  RR: 16 (16 - 16)  SpO2: 100% (100% - 100%)  Daily       PPSV2: 30  %  FAST:    General: older female, pleasant, oriented and fully conversive, uncomfortable, holding back of head  Mental Status: AOx3, fully oriented  HEENT: dmm, perrl, eomi  Lungs: dec at bases bl  Cardiac: +s1 s2 rrr  GI: soft nt nd +bs  Ext: +improved swelling in LUE with erythema, no ttp, dilated  superficial veins above clavicular line, redness around hd access site with ttp over right sternocleidomastoid muscle, no ttp over back of head, rt bka  Neuro: moves extremities no focal deficits    LABS:    05-13    140  |  103  |  30<H>  ----------------------------<  160<H>  3.6   |  24  |  3.64<H>    Ca    8.7      13 May 2017 06:54        Albumin: Albumin, Serum: 3.0 g/dL (05-11 @ 05:43)      Allergies    No Known Allergies    Intolerances      MEDICATIONS  (STANDING):  aspirin enteric coated 81milliGRAM(s) Oral daily  isosorbide   mononitrate ER Tablet (IMDUR) 60milliGRAM(s) Oral daily  gabapentin 300milliGRAM(s) Oral at bedtime  sertraline 50milliGRAM(s) Oral daily  atorvastatin 10milliGRAM(s) Oral at bedtime  fluorometholone 0.1% Suspension 1Drop(s) Both EYES two times a day  heparin  Injectable 5000Unit(s) SubCutaneous every 8 hours  doxercalciferol Injectable 1MICROGram(s) IV Push <User Schedule>  carvedilol 12.5milliGRAM(s) Oral every 12 hours  epoetin hazel Injectable 8000Unit(s) IV Push <User Schedule>  Nephrocaps 1Capsule(s) Oral daily    MEDICATIONS  (PRN):  acetaminophen   Tablet 650milliGRAM(s) Oral every 6 hours PRN For Temp greater than 38 C (100.4 F)  acetaminophen   Tablet. 650milliGRAM(s) Oral every 6 hours PRN Mild Pain (1 - 3)  metoclopramide Injectable 10milliGRAM(s) IV Push once PRN Nausea and/or Vomiting  senna 2Tablet(s) Oral at bedtime PRN Constipation  docusate sodium 100milliGRAM(s) Oral three times a day PRN Constipation      RADIOLOGY:

## 2017-05-14 NOTE — PROGRESS NOTE ADULT - ASSESSMENT
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 Found to have bilateral upper extremity thrombi compromising her dialysis access, s/p outpatient eval for AVF salvage which was unsuccessful. Being treated for resultant cellulitis. Palliative care consulted to help establish GOC.    1) Neck and headache  - likely musculoskeletal from recent procedure, reproducible in neck, not in back of head, could be due to muscle strain  - not responsive to tylenol, with minimal responsiveness to oxycodone (also ESRD with plan to stop dialysis so will switch to dialudid  - trial of dilaudid 1mg q4h prn  - remove HD access since no plan to c/w this     2) AMS- resolved  - likely 2/2 to delirium from underlying infection  - fall and aspiration precautions  - back to baseline since yesterday afternoon    3) Cellulitis/ AVF obstruction  - renal, ID, and surgical notes appreciated  - on IV abx  - with temporary access     4) ESRD  - nephrology notes appreciated  - tolerating HD  - pt decided she no longer wants to continue with this, Dr. Leal made aware    4) Prognosis  - guarded  - likely overall poor given complicated PMHx including ESRD, severe PVD, propensity to clot, immobility, and cardiac disease. Pt contemplative about stopping HD, especially in light of issues with HD access and decreased QOL overall, decided to stop today thus hospice candidate. Prognosis without hospice will likely be days-weeks without dialysis (more likely days once Cr begins to rise)     5) GOC/advanced disease  - pt does not have capacity currently for medical decisions  - HCP is  Darrel Aguilar [phone] and daughter Kaia Aguilar  - DNR and DNI  - family meeting today with decisions made for DNR/DNI, no further HD, home hospice referral with [facility], SW, hospitalist, ID and nephrology aware. Family will need list of private aid agencies as well in order to take pt home. Spent 45 minutes discussing GOC with family including Advance care planning including the explanation and discussion of advance directives, finalized DNR/DNI form, and hospice. See GO note for further details.    Thank you for including us in Mrs. Aguilar's care. Will continue to follow with you.    Jocelynn Garcia MD  Palliative Care Attending

## 2017-05-14 NOTE — CHART NOTE - NSCHARTNOTEFT_GEN_A_CORE
Pt rescinded decision to forgo dialysis, denying saying this, family upset as they were present when she did. She would like to move forward with HD and have access site moved for greater comfort. Seems pt was most focused on the prospect of going home, says now that she was only contemplative about stopping HD (though she was very clear about this decision previously, even stating understanding the risk of death and acceptance of this). She confirmed that she would still want to be DNR/DNI. Reassured pt that she is within her right to change her mind and that the entire team will continue to support her decisions. Everyone made aware of intent to return to plan to continue with dialysis: Dr. Leal, Dr. Carrasco, and Dr. Portillo.     Thank you for including us in Mrs. Aguilar's care. Will continue to follow with you.    Jocelynn Garcia MD  Palliative Care Attending

## 2017-05-14 NOTE — PROGRESS NOTE ADULT - ASSESSMENT
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  Pt has L UE avf, same side of AICD, with clotted UE bilaterally.. Pt recently seen by her surgeon Dr awadallah who dx her w bilat UE clots, she was also seen at [facility] yesterday, I spoke to Dr hearn.. He stated he could not get a through the clots adjacent to the PM wire to de clot the vein. he suggested ligating the avf may reduce the swelling..  Today arm is erythematous and pt is febrile, lethargic. IV abx  given in ER ( zosyn, rocephin, vanco)    D/W pts daughter, will place femoral line and dialyze today  ID consult, Dr Carrasco informed, suggested to place on Cefepime 1 g iv daily starting in am, after hd on hd days..  Dr De La O consulted for second opinion of av access..  lactate level w HD    5/10 SY  --ESRD  Continue TIW HD   Will plan HD 5/11 and 5/12 with plans to remove temporary femoral catheter post tx on 5/12.  Will plan permanent access depending on results of blood cultures.  ID and Vascular follow up for permanent access and ligation of Left UE AVF.  --AMS  likely due to sepsis.  Continue to follow closely    5/11 SY  --ESRD   Tolerating tx fairly ok.  HD order and tx reviewed.  Will plan HD again in am with plans to remove femoral catheter post HD tomorrow.  D/W Dr William.  --HD access.   Plan for Permcath on monday for now.   Awaiting call back from Dr Peters re further plans for current AVF ligation and creation of a new AVF possibly.  --ID  : continue abtx for LUE cellulitis.  --AMS improving  --PVC  : follow up repeat CXR.  Will attempt to increase UF with HD.  BP borderline on HD today.   D/c Amlodipine and decrease dose of Coreg.    5/12 MK  - ESRD tolerating HD goal uf of 1 kg.  for removal of dialysis catheter post HD. Bp more stable with removal of HD  - LUE cellulitits: abx as per ID.    - LUE AVF:for ligation and Perm cath placement in AM  - Palliative input noted    5/13  comfortable eating   no complaints  for hd monday  R IJ cvc in place      5/14  pt earlier commented that she wants to stop hd but then stated she is considering it.  Daughter and  in the room  due for hd through R ij, which she states is uncomfortable  discussed with palliative care  will d/w dr de la o re site of perm access

## 2017-05-14 NOTE — PROGRESS NOTE ADULT - SUBJECTIVE AND OBJECTIVE BOX
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 The patient was recently evaluated by her vascular surgeon Dr. Awadallah who diagnosed her w/ B/L UE thrombi on 5/4/2017 (of note the patients' last HD session was on Saturday). The patient was evaluated yesterday at [facility]. Per Renal attempt at AVF salvage was unsuccessful. Today the patient was referred to the ED from [facility] as the patient was noted to be increasingly lethargic, febrile, and had left arm swelling and erythema. In the ED the patient was given Ceftriaxone 1g IV x 1, Zosyn 3.375g IV x 1, and Vancomycin 1g IV x 1. The patient was admitted to the CCU for emergent dialysis and shiley catheter placement. Renal, Vascular Surgery, and ID were consulted in the ED. (09 May 2017 18:07)    5/13  s/p ligation of lft avf and placement of R IJ CVC  pt due for hd monday 5/14  pt earlier commented that she wants to stop hd but then stated she is considering it.  Daughter and  in the room  due for hd through R ij, which she states is uncomfortable      PAST MEDICAL & SURGICAL HISTORY:  AICD (automatic cardioverter/defibrillator) present  AVF (arteriovenous fistula): Left  DM2 (diabetes mellitus, type 2)  CVA (cerebral vascular accident)  MI (myocardial infarction)  CAD (coronary artery disease)  ESRD on hemodialysis  Cervical carcinoma  No pertinent past medical history  H/O bilateral oophorectomy  S/P BKA (below knee amputation) unilateral, right  S/P CABG (coronary artery bypass graft)  No significant past surgical history      FAMILY HISTORY:  No pertinent family history in first degree relatives      MEDICATIONS  (STANDING):  aspirin enteric coated 81milliGRAM(s) Oral daily  isosorbide   mononitrate ER Tablet (IMDUR) 60milliGRAM(s) Oral daily  gabapentin 300milliGRAM(s) Oral at bedtime  sertraline 50milliGRAM(s) Oral daily  atorvastatin 10milliGRAM(s) Oral at bedtime  fluorometholone 0.1% Suspension 1Drop(s) Both EYES two times a day  heparin  Injectable 5000Unit(s) SubCutaneous every 8 hours  doxercalciferol Injectable 1MICROGram(s) IV Push <User Schedule>  carvedilol 12.5milliGRAM(s) Oral every 12 hours  epoetin hazel Injectable 8000Unit(s) IV Push <User Schedule>  Nephrocaps 1Capsule(s) Oral daily    MEDICATIONS  (PRN):  acetaminophen   Tablet 650milliGRAM(s) Oral every 6 hours PRN For Temp greater than 38 C (100.4 F)  acetaminophen   Tablet. 650milliGRAM(s) Oral every 6 hours PRN Mild Pain (1 - 3)  metoclopramide Injectable 10milliGRAM(s) IV Push once PRN Nausea and/or Vomiting  senna 2Tablet(s) Oral at bedtime PRN Constipation  docusate sodium 100milliGRAM(s) Oral three times a day PRN Constipation      Allergies    No Known Allergies    Intolerances        I&O's Summary    I & Os for current day (as of 13 May 2017 14:23)  =============================================  IN: 250 ml / OUT: 0 ml / NET: 250 ml        REVIEW OF SYSTEMS:    CONSTITUTIONAL:  As per HPI.    HEENT:  Eyes:  No diplopia or blurred vision. ENT:  No earache, sore throat or runny nose.    CARDIOVASCULAR:  No pressure, squeezing, strangling, tightness, heaviness or aching about the chest, neck, axilla or epigastrium.    RESPIRATORY:  No cough, shortness of breath, PND or orthopnea.    GASTROINTESTINAL:  No nausea, vomiting or diarrhea.    GENITOURINARY:  No dysuria, frequency or urgency.    MUSCULOSKELETAL:  As per HPI.    SKIN:  No change in skin, hair or nails.    NEUROLOGIC:  No paresthesias, fasciculations, seizures or weakness.    PSYCHIATRIC:  No disorder of thought or mood.    ENDOCRINE:  No heat or cold intolerance, polyuria or polydipsia.    HEMATOLOGICAL:  No easy bruising or bleeding.      Vital Signs Last 24 Hrs  T(C): 36.5, Max: 37.5 (05-12 @ 22:00)  T(F): 97.7, Max: 99.5 (05-12 @ 22:00)  HR: 57 (55 - 72)  BP: 179/53 (127/57 - 179/53)  BP(mean): --  RR: 20 (11 - 20)  SpO2: 97% (97% - 100%)  Daily     Daily     PHYSICAL EXAM:    General:  Alert, well-developed ,No acute distress.    Neuro:  Alert and oriented to person, place, and time. Able to communicate  well. Cranial nerves 2-12 grossly intact. 5/5 strength in all  extremities bilaterally. Sensation intact in all extremities.  Appropriate affect.     HEENT:  No JVD, no masses, Eyes anicteric, No carotid bruits.No lymphadenopathy,    Cardiovascular:  Regular rate and rhythm, with normal S1 and S2. No murmurs, rubs,  or gallops. No JVD.    Lungs:  Lungs clear. no rales, no wheezing, .    Abdomen:  Normoactive bowel sounds. Soft, flat, non-tender, and non-distended.  No hepatosplenomegaly, positive bowel sounds    Skin:  Warm, dry, well-perfused. No rashes or other lesions.     Extremities:  R BKA    LABS:                        10.6   5.9   )-----------( 142      ( 12 May 2017 09:15 )             32.8     05-13    140  |  103  |  30<H>  ----------------------------<  160<H>  3.6   |  24  |  3.64<H>    Ca    8.7      13 May 2017 06:54  Phos  3.8     05-12

## 2017-05-14 NOTE — PROGRESS NOTE ADULT - SUBJECTIVE AND OBJECTIVE BOX
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 The patient was recently evaluated by her vascular surgeon Dr. Awadallah who diagnosed her w/ B/L UE thrombi on 5/4/2017.    5/10:  Pt more awake today as per RN but does not give any history.    5/11:  Pt more awake and alert today  venous doppler of both arms NEGATIVE for blood clots and Patent left arm AV fistula    5/12:  more awake alert today    5/13:  awake, alert today, asking for regular food  s/p RIJ cath and left SVF ligation    5/14:  c/o right sided neck pain  SBP elevated to 170      PHYSICAL EXAM:    Daily     Daily     ICU Vital Signs Last 24 Hrs  T(C): 36.3, Max: 36.3 (05-14 @ 05:28)  T(F): 97.3, Max: 97.3 (05-14 @ 05:28)  HR: 60 (60 - 68)  BP: 174/59 (167/52 - 182/57)  BP(mean): --  ABP: --  ABP(mean): --  RR: 16 (16 - 16)  SpO2: 100% (100% - 100%)      Constitutional: Well appearing  HEENT: Atraumatic, SILVERIO, Normal, No congestion  Respiratory: Breath Sounds normal, no rhonchi/wheeze  Cardiovascular: N S1S2; BOY present  Gastrointestinal: Abdomen soft, non tender, Bowel Sounds present  Extremities: No edema, peripheral pulses present  Neurological: AAO x 3, no gross focal motor deficits  Skin: Non cellulitic, no rash, ulcers  Lymph Nodes: No lymphadenopathy noted  Back: No CVA tenderness   Musculoskeletal: non tender  Breasts: Deferred  Genitourinary: deferred  Rectal: Deferred            05-13    140  |  103  |  30<H>  ----------------------------<  160<H>  3.6   |  24  |  3.64<H>    Ca    8.7      13 May 2017 06:54                              MEDICATIONS  (STANDING):  aspirin enteric coated 81milliGRAM(s) Oral daily  isosorbide   mononitrate ER Tablet (IMDUR) 60milliGRAM(s) Oral daily  gabapentin 300milliGRAM(s) Oral at bedtime  sertraline 50milliGRAM(s) Oral daily  atorvastatin 10milliGRAM(s) Oral at bedtime  fluorometholone 0.1% Suspension 1Drop(s) Both EYES two times a day  heparin  Injectable 5000Unit(s) SubCutaneous every 8 hours  doxercalciferol Injectable 1MICROGram(s) IV Push <User Schedule>  carvedilol 12.5milliGRAM(s) Oral every 12 hours  epoetin hazel Injectable 8000Unit(s) IV Push <User Schedule>  Nephrocaps 1Capsule(s) Oral daily  amLODIPine   Tablet 5milliGRAM(s) Oral daily

## 2017-05-14 NOTE — PROGRESS NOTE ADULT - SUBJECTIVE AND OBJECTIVE BOX
POD #1 status post ligation L brachiobasilic AVF and placement of R IJ Hemastar    BP difficult to control    MEDICATIONS  (STANDING):  aspirin enteric coated 81milliGRAM(s) Oral daily  isosorbide   mononitrate ER Tablet (IMDUR) 60milliGRAM(s) Oral daily  gabapentin 300milliGRAM(s) Oral at bedtime  sertraline 50milliGRAM(s) Oral daily  atorvastatin 10milliGRAM(s) Oral at bedtime  fluorometholone 0.1% Suspension 1Drop(s) Both EYES two times a day  heparin  Injectable 5000Unit(s) SubCutaneous every 8 hours  doxercalciferol Injectable 1MICROGram(s) IV Push <User Schedule>  carvedilol 12.5milliGRAM(s) Oral every 12 hours  epoetin hazel Injectable 8000Unit(s) IV Push <User Schedule>  Nephrocaps 1Capsule(s) Oral daily    MEDICATIONS  (PRN):  acetaminophen   Tablet 650milliGRAM(s) Oral every 6 hours PRN For Temp greater than 38 C (100.4 F)  acetaminophen   Tablet. 650milliGRAM(s) Oral every 6 hours PRN Mild Pain (1 - 3)  metoclopramide Injectable 10milliGRAM(s) IV Push once PRN Nausea and/or Vomiting  senna 2Tablet(s) Oral at bedtime PRN Constipation  docusate sodium 100milliGRAM(s) Oral three times a day PRN Constipation      Allergies    No Known Allergies    Intolerances        Flatus: [ ] YES [ ] NO             Bowel Movement: [ ] YES [ ] NO  Pain (0-10):            Pain Control Adequate: [ ] YES [ ] NO  Nausea: [ ] YES [ ] NO            Vomiting: [ ] YES [ ] NO  Diarrhea: [ ] YES [ ] NO         Constipation: [ ] YES [ ] NO     Chest Pain: [ ] YES [ ] NO    SOB:  [ ] YES [ ] NO    Vital Signs Last 24 Hrs  T(C): 36.3, Max: 36.5 (05-13 @ 11:26)  T(F): 97.3, Max: 97.7 (05-13 @ 11:26)  HR: 61 (55 - 68)  BP: 181/74 (145/41 - 182/57)  BP(mean): --  RR: 16 (11 - 20)  SpO2: 100% (97% - 100%)    I&O's Summary    I & Os for current day (as of 14 May 2017 07:43)  =============================================  IN: 250 ml / OUT: 0 ml / NET: 250 ml      Physical Exam:  General: NAD, resting comfortably    R cervical dressing stained but no active bleeding, no signficant hematoma; L arm incision without hematoma; L hand pink, warm and well perfused                        10.6   5.9   )-----------( 142      ( 12 May 2017 09:15 )             32.8     05-13    140  |  103  |  30<H>  ----------------------------<  160<H>  3.6   |  24  |  3.64<H>    Ca    8.7      13 May 2017 06:54  Phos  3.8     05-12            CAPILLARY BLOOD GLUCOSE      RADIOLOGY & ADDITIONAL TESTS:

## 2017-05-14 NOTE — PROGRESS NOTE ADULT - ASSESSMENT
71/F , DNR, admitted with     1) AMS/Metabolic encephalopathy d/t cellulitis + Left arm cellulitis + Infected Left arm AVF: Stable hemodynamically  transferred to medical floor  OFF ABX as per ID   Blood cx no growth    2) ?Clotted Left AVF + ESRD on HD: Patent  left AVF on doppler  appreciate Vascular Sx and renal consults  being dialyzed thru temp access in right groin  would do Venous doppler of b/l upper ext as advised by vascular sx.: patent Left AVF  would need an access for HD for long term:   s/p RIJ cath placement and ligation of Left AVFon 5/13 by Dr. Morin.  cont HD as per renal    3) HTN: uncontrolled may be sec to component of right sided neck pain  cont amlodipine, coreg, pain meds    4) DM 2:  ISS    5) Diet:  renal diet with aspiration precautions    6) CAD:  cont asa, statins, coreg    poc discussed with pt, family, team

## 2017-05-14 NOTE — PROGRESS NOTE ADULT - ASSESSMENT
satisfactory post op    medical management of BP    family to decide re course of action regarding continuing dialysis; if they do want to continue, would obtain R upper extremity venogram if I am to continue her vascular access care

## 2017-05-15 LAB
ALBUMIN SERPL ELPH-MCNC: 2.9 G/DL — LOW (ref 3.3–5)
ANION GAP SERPL CALC-SCNC: 13 MMOL/L — SIGNIFICANT CHANGE UP (ref 5–17)
BUN SERPL-MCNC: 55 MG/DL — HIGH (ref 7–23)
CALCIUM SERPL-MCNC: 8.3 MG/DL — LOW (ref 8.5–10.1)
CHLORIDE SERPL-SCNC: 102 MMOL/L — SIGNIFICANT CHANGE UP (ref 96–108)
CO2 SERPL-SCNC: 24 MMOL/L — SIGNIFICANT CHANGE UP (ref 22–31)
CREAT SERPL-MCNC: 6.32 MG/DL — HIGH (ref 0.5–1.3)
GLUCOSE SERPL-MCNC: 173 MG/DL — HIGH (ref 70–99)
HCT VFR BLD CALC: 35.4 % — SIGNIFICANT CHANGE UP (ref 34.5–45)
HGB BLD-MCNC: 11 G/DL — LOW (ref 11.5–15.5)
MCHC RBC-ENTMCNC: 30.2 PG — SIGNIFICANT CHANGE UP (ref 27–34)
MCHC RBC-ENTMCNC: 31 GM/DL — LOW (ref 32–36)
MCV RBC AUTO: 97.5 FL — SIGNIFICANT CHANGE UP (ref 80–100)
PHOSPHATE SERPL-MCNC: 5 MG/DL — HIGH (ref 2.5–4.5)
PLATELET # BLD AUTO: 181 K/UL — SIGNIFICANT CHANGE UP (ref 150–400)
POTASSIUM SERPL-MCNC: 4.6 MMOL/L — SIGNIFICANT CHANGE UP (ref 3.5–5.3)
POTASSIUM SERPL-SCNC: 4.6 MMOL/L — SIGNIFICANT CHANGE UP (ref 3.5–5.3)
RBC # BLD: 3.63 M/UL — LOW (ref 3.8–5.2)
RBC # FLD: 15.2 % — HIGH (ref 10.3–14.5)
SODIUM SERPL-SCNC: 139 MMOL/L — SIGNIFICANT CHANGE UP (ref 135–145)
WBC # BLD: 8.3 K/UL — SIGNIFICANT CHANGE UP (ref 3.8–10.5)
WBC # FLD AUTO: 8.3 K/UL — SIGNIFICANT CHANGE UP (ref 3.8–10.5)

## 2017-05-15 RX ORDER — ERYTHROPOIETIN 10000 [IU]/ML
8000 INJECTION, SOLUTION INTRAVENOUS; SUBCUTANEOUS ONCE
Qty: 0 | Refills: 0 | Status: COMPLETED | OUTPATIENT
Start: 2017-05-15 | End: 2017-05-15

## 2017-05-15 RX ADMIN — FLUOROMETHOLONE 1 DROP(S): 1 SOLUTION/ DROPS OPHTHALMIC at 17:58

## 2017-05-15 RX ADMIN — SERTRALINE 50 MILLIGRAM(S): 25 TABLET, FILM COATED ORAL at 13:00

## 2017-05-15 RX ADMIN — Medication 1 CAPSULE(S): at 15:01

## 2017-05-15 RX ADMIN — AMLODIPINE BESYLATE 5 MILLIGRAM(S): 2.5 TABLET ORAL at 04:29

## 2017-05-15 RX ADMIN — HEPARIN SODIUM 5000 UNIT(S): 5000 INJECTION INTRAVENOUS; SUBCUTANEOUS at 04:29

## 2017-05-15 RX ADMIN — HYDROMORPHONE HYDROCHLORIDE 1 MILLIGRAM(S): 2 INJECTION INTRAMUSCULAR; INTRAVENOUS; SUBCUTANEOUS at 09:14

## 2017-05-15 RX ADMIN — HEPARIN SODIUM 5000 UNIT(S): 5000 INJECTION INTRAVENOUS; SUBCUTANEOUS at 21:57

## 2017-05-15 RX ADMIN — CARVEDILOL PHOSPHATE 12.5 MILLIGRAM(S): 80 CAPSULE, EXTENDED RELEASE ORAL at 04:29

## 2017-05-15 RX ADMIN — FLUOROMETHOLONE 1 DROP(S): 1 SOLUTION/ DROPS OPHTHALMIC at 04:29

## 2017-05-15 RX ADMIN — CARVEDILOL PHOSPHATE 12.5 MILLIGRAM(S): 80 CAPSULE, EXTENDED RELEASE ORAL at 17:59

## 2017-05-15 RX ADMIN — ATORVASTATIN CALCIUM 10 MILLIGRAM(S): 80 TABLET, FILM COATED ORAL at 21:58

## 2017-05-15 RX ADMIN — OXYCODONE HYDROCHLORIDE 5 MILLIGRAM(S): 5 TABLET ORAL at 04:31

## 2017-05-15 RX ADMIN — HEPARIN SODIUM 5000 UNIT(S): 5000 INJECTION INTRAVENOUS; SUBCUTANEOUS at 17:58

## 2017-05-15 RX ADMIN — DOXERCALCIFEROL 1 MICROGRAM(S): 2.5 CAPSULE ORAL at 12:12

## 2017-05-15 RX ADMIN — GABAPENTIN 300 MILLIGRAM(S): 400 CAPSULE ORAL at 21:57

## 2017-05-15 RX ADMIN — ISOSORBIDE MONONITRATE 60 MILLIGRAM(S): 60 TABLET, EXTENDED RELEASE ORAL at 13:00

## 2017-05-15 RX ADMIN — Medication 81 MILLIGRAM(S): at 13:00

## 2017-05-15 NOTE — PHYSICAL THERAPY INITIAL EVALUATION ADULT - ADDITIONAL COMMENTS
s/p AKA in August 2016.  Patient not fit with prosthetic leg, not able to ambulate at [facility], d/c'd from physical therapy at [facility].

## 2017-05-15 NOTE — PROGRESS NOTE ADULT - ASSESSMENT
status post R IJ Hemastar and ligation of non functional but patent L brachiobasilic AVF with improvement in L arm edema (prior to ligation, discussed with Dr. 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    Will schedule for R arm venogram to determine if access on R upper extremity an option

## 2017-05-15 NOTE — PROGRESS NOTE ADULT - SUBJECTIVE AND OBJECTIVE BOX
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 The patient was recently evaluated by her vascular surgeon Dr. Awadallah who diagnosed her w/ B/L UE thrombi on 5/4/2017.    5/10:  Pt more awake today as per RN but does not give any history.    5/11:  Pt more awake and alert today  venous doppler of both arms NEGATIVE for blood clots and Patent left arm AV fistula    5/12:  more awake alert today    5/13:  awake, alert today, asking for regular food  s/p RIJ cath and left SVF ligation    5/14:  c/o right sided neck pain  SBP elevated to 170    5/15: getting HD done, c/o right sided neck pain, wants the catheter to be taken out      PHYSICAL EXAM:    Daily     Daily     ICU Vital Signs Last 24 Hrs  T(C): 36.3, Max: 36.6 (05-14 @ 16:49)  T(F): 97.4, Max: 97.9 (05-14 @ 16:49)  HR: 55 (54 - 93)  BP: 150/55 (107/49 - 177/90)  BP(mean): --  ABP: --  ABP(mean): --  RR: 17 (8 - 18)  SpO2: 100% (100% - 100%)      Constitutional: Well appearing  HEENT: Atraumatic, SILVERIO, Normal, No congestion  Respiratory: Breath Sounds normal, no rhonchi/wheeze  Cardiovascular: N S1S2; BOY present  Gastrointestinal: Abdomen soft, non tender, Bowel Sounds present  Extremities: No edema, peripheral pulses present  Neurological: AAO x 3, no gross focal motor deficits  Skin: Non cellulitic, no rash, ulcers  Lymph Nodes: No lymphadenopathy noted  Back: No CVA tenderness   Musculoskeletal: non tender  Breasts: Deferred  Genitourinary: deferred  Rectal: Deferred                          11.0   8.3   )-----------( 181      ( 15 May 2017 08:50 )             35.4       CBC Full  -  ( 15 May 2017 08:50 )  WBC Count : 8.3 K/uL  Hemoglobin : 11.0 g/dL  Hematocrit : 35.4 %  Platelet Count - Automated : 181 K/uL  Mean Cell Volume : 97.5 fl  Mean Cell Hemoglobin : 30.2 pg  Mean Cell Hemoglobin Concentration : 31.0 gm/dL  Auto Neutrophil # : x  Auto Lymphocyte # : x  Auto Monocyte # : x  Auto Eosinophil # : x  Auto Basophil # : x  Auto Neutrophil % : x  Auto Lymphocyte % : x  Auto Monocyte % : x  Auto Eosinophil % : x  Auto Basophil % : x      05-15    139  |  102  |  55<H>  ----------------------------<  173<H>  4.6   |  24  |  6.32<H>    Ca    8.3<L>      15 May 2017 08:50  Phos  5.0     05-15    TPro  x   /  Alb  2.9<L>  /  TBili  x   /  DBili  x   /  AST  x   /  ALT  x   /  AlkPhos  x   05-15      LIVER FUNCTIONS - ( 15 May 2017 08:50 )  Alb: 2.9 g/dL / Pro: x     / ALK PHOS: x     / ALT: x     / AST: x     / GGT: x                               MEDICATIONS  (STANDING):  aspirin enteric coated 81milliGRAM(s) Oral daily  isosorbide   mononitrate ER Tablet (IMDUR) 60milliGRAM(s) Oral daily  gabapentin 300milliGRAM(s) Oral at bedtime  sertraline 50milliGRAM(s) Oral daily  atorvastatin 10milliGRAM(s) Oral at bedtime  fluorometholone 0.1% Suspension 1Drop(s) Both EYES two times a day  heparin  Injectable 5000Unit(s) SubCutaneous every 8 hours  doxercalciferol Injectable 1MICROGram(s) IV Push <User Schedule>  carvedilol 12.5milliGRAM(s) Oral every 12 hours  epoetin haezl Injectable 8000Unit(s) IV Push <User Schedule>  Nephrocaps 1Capsule(s) Oral daily  amLODIPine   Tablet 5milliGRAM(s) Oral daily

## 2017-05-15 NOTE — PROGRESS NOTE ADULT - ASSESSMENT
71/F , DNR, admitted with     1) AMS/Metabolic encephalopathy d/t cellulitis + Left arm cellulitis + Infected Left arm AVF: Stable hemodynamically  transferred to medical floor  Monitor Off ABX as per ID   Blood cx no growth    2) ?Clotted Left AVF + ESRD on HD: Patent  left AVF on doppler  appreciate Vascular Sx and renal consults  being dialyzed thru temp access in right groin  would do Venous doppler of b/l upper ext as advised by vascular sx.: patent Left AVF  would need an access for HD for long term:   s/p RIJ cath placement and ligation of Left AVFon 5/13 by Dr. Morin.  cont HD as per renal team; pt wants to continue HD.   Dr. Morin to do right arm venogram for evaluation for access for AV fistula for HD    3) HTN: Better today;  may be sec to component of right sided neck pain  cont amlodipine, coreg, pain meds    4) DM 2:  ISS    5) Diet:  renal diet with aspiration precautions    6) CAD:  cont asa, statins, coreg    poc discussed with pt, family, team

## 2017-05-15 NOTE — PROGRESS NOTE ADULT - ASSESSMENT
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 Found to have bilateral upper extremity thrombi compromising her dialysis access, s/p outpatient eval for AVF salvage which was unsuccessful. Being treated for resultant cellulitis. Palliative care consulted to help establish GOC.    1) Neck and headache  - likely musculoskeletal from recent procedure, reproducible in neck, not in back of head, could be due to muscle strain  - not responsive to tylenol, with minimal responsiveness to oxycodone (also ESRD with plan to stop dialysis so will switch to dialudid)  - trial of dilaudid 1mg q4h prn  - HD access also adjusted with good result    2) AMS- resolved  - likely 2/2 to delirium from underlying infection  - fall and aspiration precautions  - back to baseline     3) Cellulitis/ AVF obstruction  - renal, ID, and surgical notes appreciated  - on IV abx  - with temporary access, vascular notes appreciated     4) ESRD  - nephrology notes appreciated  - tolerating HD  - changed mind and would like to c/w this    5) Prognosis  - guarded  - likely overall poor given complicated PMHx including ESRD, severe PVD, propensity to clot, immobility, and cardiac disease. Pt contemplative about stopping HD, especially in light of issues with HD access and decreased QOL overall, if ever changes mind again thus hospice candidate.     6) GOC/advanced disease  - pt does not have capacity currently for medical decisions  - HCP is  Darrel Aguilar [phone] and daughter Kaia Aguilar  - DNR and DNI  - family meeting yesterday (5/14) with initial decisio to stop HD, pt changed mind and now wants to continue with this and return to [facility]. Filld out MOLST in prep for this. Spent 45 minutes discussing GOC with family including Advance care planning, finalized MOLST form with pt and nursing. See GOC note for further details.    * sx managed and GOC clear, MOLST completed, will sign off*    Thank you for including us in Mrs. Aguilar's care.   Jocelynn Garcia MD  Palliative Care Attending

## 2017-05-15 NOTE — PROGRESS NOTE ADULT - ASSESSMENT
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  Pt has L UE avf, same side of AICD, with clotted UE bilaterally.. Pt recently seen by her surgeon Dr awadallah who dx her w bilat UE clots, she was also seen at [facility], I spoke to Dr hearn.. He stated he could not get a through the clots adjacent to the PM wire to de clot the vein. he suggested ligating the avf may reduce the swelling..  Today arm is erythematous and pt is febrile, lethargic. IV abx  given in ER ( zosyn, rocephin, vanco)    D/W pts daughter, will place femoral line and dialyze today  ID consult, Dr Carrasco informed, suggested to place on Cefepime 1 g iv daily starting in am, after hd on hd days..  Dr De La O consulted for second opinion of av access..  lactate level w HD    5/10 SY  --ESRD  Continue TIW HD   Will plan HD 5/11 and 5/12 with plans to remove temporary femoral catheter post tx on 5/12.  Will plan permanent access depending on results of blood cultures.  ID and Vascular follow up for permanent access and ligation of Left UE AVF.  --AMS  likely due to sepsis.  Continue to follow closely    5/11 SY  --ESRD   Tolerating tx fairly ok.  HD order and tx reviewed.  Will plan HD again in am with plans to remove femoral catheter post HD tomorrow.  D/W Dr William.  --HD access.   Plan for Permcath on monday for now.   Awaiting call back from Dr Peters re further plans for current AVF ligation and creation of a new AVF possibly.  --ID  : continue abtx for LUE cellulitis.  --AMS improving  --PVC  : follow up repeat CXR.  Will attempt to increase UF with HD.  BP borderline on HD today.   D/c Amlodipine and decrease dose of Coreg.    5/12 MK  - ESRD tolerating HD goal uf of 1 kg.  for removal of dialysis catheter post HD. Bp more stable with removal of HD  - LUE cellulitits: abx as per ID.    - LUE AVF:for ligation and Perm cath placement in AM  - Palliative input noted    5/13  comfortable eating   no complaints  for hd monday  R IJ cvc in place  5/14  pt earlier commented that she wants to stop hd but then stated she is considering it.  Daughter and  in the room  due for hd through R ij, which she states is uncomfortable  discussed with palliative care  will d/w dr de la o re site of perm access   5/15 MK  - ESRD: tolerating hd with goal uf of 2 kg on 2 k bath.  - s/p AVF ligation with perm cath, pt eager to have it removed.  agreeable to have a new access placed- palliative input noted  - AMS much improved

## 2017-05-15 NOTE — GOALS OF CARE CONVERSATION - PERSONAL ADVANCE DIRECTIVE - CONVERSATION DETAILS
Reviewed MOLST with pt and made decisions outlined below. Pt confirmed she wants to continue with HD only contemplative about this before. She wants to return to [facility] whenever felt stable enough to go.
Met with pt and family to discuss GOC as pt has indicated that she no longer wants to continue with dilaysis. Pt confirmed that this is her decision given how her QOL has suffered, her family agreed with this, seeing this decline as well. They want nothing more than to honor her wishes. Pt decided she also would like to uphold her outpt DNR/DNI and would like to go home (not [facility]) to die on hospice. Family ok with [facility] referral and need private aid agency list to arrange this.

## 2017-05-15 NOTE — GOALS OF CARE CONVERSATION - PERSONAL ADVANCE DIRECTIVE - TREATMENT GUIDELINE COMMENT
DNR and DNI, limited interventions, send to hospital, no feeding tube, trial of fluids, determine use of antibiotics, no pressors
DNI

## 2017-05-15 NOTE — PHYSICAL THERAPY INITIAL EVALUATION ADULT - PREDICTED DURATION OF THERAPY (DAYS/WKS), PT EVAL
Will not actively follow for rehab services.  Patient at [facility], suggest [facility] for out of bed activities for  safe patient handling.

## 2017-05-15 NOTE — PHYSICAL THERAPY INITIAL EVALUATION ADULT - DIAGNOSIS, PT EVAL
AMS/Metabolic encephalopathy d/t cellulitis + Left arm, ?Clotted Left AVF, s/p RIJ cath and left SVF ligation

## 2017-05-15 NOTE — PROGRESS NOTE ADULT - SUBJECTIVE AND OBJECTIVE BOX
complains of R occipital discomfort    MEDICATIONS  (STANDING):  aspirin enteric coated 81milliGRAM(s) Oral daily  isosorbide   mononitrate ER Tablet (IMDUR) 60milliGRAM(s) Oral daily  gabapentin 300milliGRAM(s) Oral at bedtime  sertraline 50milliGRAM(s) Oral daily  atorvastatin 10milliGRAM(s) Oral at bedtime  fluorometholone 0.1% Suspension 1Drop(s) Both EYES two times a day  heparin  Injectable 5000Unit(s) SubCutaneous every 8 hours  doxercalciferol Injectable 1MICROGram(s) IV Push <User Schedule>  carvedilol 12.5milliGRAM(s) Oral every 12 hours  epoetin hazel Injectable 8000Unit(s) IV Push <User Schedule>  Nephrocaps 1Capsule(s) Oral daily  epoetin hazel Injectable 8000Unit(s) IV Push once  amLODIPine   Tablet 5milliGRAM(s) Oral daily    MEDICATIONS  (PRN):  acetaminophen   Tablet 650milliGRAM(s) Oral every 6 hours PRN For Temp greater than 38 C (100.4 F)  acetaminophen   Tablet. 650milliGRAM(s) Oral every 6 hours PRN Mild Pain (1 - 3)  metoclopramide Injectable 10milliGRAM(s) IV Push once PRN Nausea and/or Vomiting  senna 2Tablet(s) Oral at bedtime PRN Constipation  docusate sodium 100milliGRAM(s) Oral three times a day PRN Constipation  HYDROmorphone   Tablet 1milliGRAM(s) Oral every 3 hours PRN pain or dyspnea  oxyCODONE IR 5milliGRAM(s) Oral every 6 hours PRN Moderate Pain (4 - 6)      Allergies    No Known Allergies    Intolerances        Flatus: [ ] YES [ ] NO             Bowel Movement: [ ] YES [ ] NO  Pain (0-10):            Pain Control Adequate: [ ] YES [ ] NO  Nausea: [ ] YES [ ] NO            Vomiting: [ ] YES [ ] NO  Diarrhea: [ ] YES [ ] NO         Constipation: [ ] YES [ ] NO     Chest Pain: [ ] YES [ ] NO    SOB:  [ ] YES [ ] NO    Vital Signs Last 24 Hrs  T(C): 36.5, Max: 36.6 (05-14 @ 16:49)  T(F): 97.7, Max: 97.9 (05-14 @ 16:49)  HR: 61 (59 - 61)  BP: 177/90 (146/46 - 177/90)  BP(mean): --  RR: 18 (8 - 18)  SpO2: 100% (100% - 100%)    I&O's Summary      Physical Exam:  No hematoma at insertion site    L arm edema improved, no hematoma at incision  LABS:                CAPILLARY BLOOD GLUCOSE      RADIOLOGY & ADDITIONAL TESTS:

## 2017-05-15 NOTE — PROGRESS NOTE ADULT - SUBJECTIVE AND OBJECTIVE BOX
NEPHROLOGY INTERVAL HPI/OVERNIGHT EVENTS:  palliative input noted.  pt clarified her wishes to continue with hd.  s/p avf ligation    MEDICATIONS  (STANDING):  aspirin enteric coated 81milliGRAM(s) Oral daily  isosorbide   mononitrate ER Tablet (IMDUR) 60milliGRAM(s) Oral daily  gabapentin 300milliGRAM(s) Oral at bedtime  sertraline 50milliGRAM(s) Oral daily  atorvastatin 10milliGRAM(s) Oral at bedtime  fluorometholone 0.1% Suspension 1Drop(s) Both EYES two times a day  heparin  Injectable 5000Unit(s) SubCutaneous every 8 hours  doxercalciferol Injectable 1MICROGram(s) IV Push <User Schedule>  carvedilol 12.5milliGRAM(s) Oral every 12 hours  epoetin hazel Injectable 8000Unit(s) IV Push <User Schedule>  Nephrocaps 1Capsule(s) Oral daily  epoetin hazel Injectable 8000Unit(s) IV Push once  amLODIPine   Tablet 5milliGRAM(s) Oral daily    MEDICATIONS  (PRN):  acetaminophen   Tablet 650milliGRAM(s) Oral every 6 hours PRN For Temp greater than 38 C (100.4 F)  acetaminophen   Tablet. 650milliGRAM(s) Oral every 6 hours PRN Mild Pain (1 - 3)  metoclopramide Injectable 10milliGRAM(s) IV Push once PRN Nausea and/or Vomiting  senna 2Tablet(s) Oral at bedtime PRN Constipation  docusate sodium 100milliGRAM(s) Oral three times a day PRN Constipation  HYDROmorphone   Tablet 1milliGRAM(s) Oral every 3 hours PRN pain or dyspnea  oxyCODONE IR 5milliGRAM(s) Oral every 6 hours PRN Moderate Pain (4 - 6)      Allergies    No Known Allergies    Intolerances        I&O's Detail      .    Patient was seen and evaluated on dialysis.   Patient is tolerating the procedure well.   Continue dialysis:       Vital Signs Last 24 Hrs  T(C): 36.5, Max: 36.6 (05-14 @ 16:49)  T(F): 97.7, Max: 97.9 (05-14 @ 16:49)  HR: 54 (54 - 93)  BP: 116/58 (110/61 - 177/90)  BP(mean): --  RR: 18 (8 - 18)  SpO2: 100% (100% - 100%)  Daily     Daily     PHYSICAL EXAM:  General: alert. awake Ox3  HEENT: MMM  CV: s1s2 rrr  LUNGS: B/L CTA  EXT: no edema    LABS:                        11.0   8.3   )-----------( 181      ( 15 May 2017 08:50 )             35.4     05-15    139  |  102  |  55<H>  ----------------------------<  173<H>  4.6   |  24  |  6.32<H>    Ca    8.3<L>      15 May 2017 08:50  Phos  5.0     05-15    TPro  x   /  Alb  2.9<L>  /  TBili  x   /  DBili  x   /  AST  x   /  ALT  x   /  AlkPhos  x   05-15        Phosphorus Level, Serum: 5.0 mg/dL (05-15 @ 08:50)

## 2017-05-15 NOTE — CHART NOTE - NSCHARTNOTEFT_GEN_A_CORE
Patient has been afebrile for days, antibiotics have not been continued; will continue to observe off antibiotics

## 2017-05-15 NOTE — PROGRESS NOTE ADULT - SUBJECTIVE AND OBJECTIVE BOX
HPI: Pt seen and examined this afternoon in follow up for sx and GOC. Pt denies pain, feels better since access adjusted, tolerated dialysis well. Says meds work for her when pain arises. She denies any other complaints, inquiring about when she can go back [facility].    In prep for this dc plan discussed MOLST form in entirety and filled it out, placed on chart, see GOC note for further details.       PAIN: none  Location  Intensity  Quality  Aggravating Factors  Alleviating Factors  Timing    DYSPNEA: none      ROS:    Anxiety- denies  Depression- denies  Physical Discomfort- at times  Nausea- no  Vomiting- no  Anorexia- no    All other systems reviewed and negative    PHYSICAL EXAM:    Vital Signs Last 24 Hrs  T(C): 36.3, Max: 36.6 (05-14 @ 16:49)  T(F): 97.4, Max: 97.9 (05-14 @ 16:49)  HR: 55 (54 - 93)  BP: 150/55 (107/49 - 177/90)  BP(mean): --  RR: 17 (8 - 18)  SpO2: 100% (100% - 100%)  Daily       PPSV2: 30  %  FAST:    General: older female, pleasant, oriented and fully conversive, NAD  Mental Status: AOx3, fully oriented  HEENT: dmm, perrl, eomi  Lungs: dec at bases bl  Cardiac: +s1 s2 rrr  GI: soft nt nd +bs  Ext: +improved swelling in LUE with erythema, no ttp, dilated  superficial veins above clavicular line, no ttp over hd access site with no ttp over back of head, rt bka  Neuro: moves extremities no focal deficits    LABS:                        11.0   8.3   )-----------( 181      ( 15 May 2017 08:50 )             35.4     05-15    139  |  102  |  55<H>  ----------------------------<  173<H>  4.6   |  24  |  6.32<H>    Ca    8.3<L>      15 May 2017 08:50  Phos  5.0     05-15    TPro  x   /  Alb  2.9<L>  /  TBili  x   /  DBili  x   /  AST  x   /  ALT  x   /  AlkPhos  x   05-15      Albumin: Albumin, Serum: 2.9 g/dL (05-15 @ 08:50)      Allergies    No Known Allergies    Intolerances      MEDICATIONS  (STANDING):  aspirin enteric coated 81milliGRAM(s) Oral daily  isosorbide   mononitrate ER Tablet (IMDUR) 60milliGRAM(s) Oral daily  gabapentin 300milliGRAM(s) Oral at bedtime  sertraline 50milliGRAM(s) Oral daily  atorvastatin 10milliGRAM(s) Oral at bedtime  fluorometholone 0.1% Suspension 1Drop(s) Both EYES two times a day  heparin  Injectable 5000Unit(s) SubCutaneous every 8 hours  doxercalciferol Injectable 1MICROGram(s) IV Push <User Schedule>  carvedilol 12.5milliGRAM(s) Oral every 12 hours  epoetin hazel Injectable 8000Unit(s) IV Push <User Schedule>  Nephrocaps 1Capsule(s) Oral daily  amLODIPine   Tablet 5milliGRAM(s) Oral daily    MEDICATIONS  (PRN):  acetaminophen   Tablet 650milliGRAM(s) Oral every 6 hours PRN For Temp greater than 38 C (100.4 F)  acetaminophen   Tablet. 650milliGRAM(s) Oral every 6 hours PRN Mild Pain (1 - 3)  metoclopramide Injectable 10milliGRAM(s) IV Push once PRN Nausea and/or Vomiting  senna 2Tablet(s) Oral at bedtime PRN Constipation  docusate sodium 100milliGRAM(s) Oral three times a day PRN Constipation  HYDROmorphone   Tablet 1milliGRAM(s) Oral every 3 hours PRN pain or dyspnea  oxyCODONE IR 5milliGRAM(s) Oral every 6 hours PRN Moderate Pain (4 - 6)      RADIOLOGY:

## 2017-05-15 NOTE — PHYSICAL THERAPY INITIAL EVALUATION ADULT - PERTINENT HX OF CURRENT PROBLEM, REHAB EVAL
72 yo F admitted from [facility] where she was found to be increasingly lethargic, febrile, with left arm swelling and erythema. The patient was recently evaluated by her vascular surgeon Dr. Awadallah who diagnosed her w/ B/L UE thrombi on 5/4/2017.

## 2017-05-16 PROCEDURE — 70450 CT HEAD/BRAIN W/O DYE: CPT | Mod: 26

## 2017-05-16 RX ADMIN — ATORVASTATIN CALCIUM 10 MILLIGRAM(S): 80 TABLET, FILM COATED ORAL at 22:19

## 2017-05-16 RX ADMIN — CARVEDILOL PHOSPHATE 12.5 MILLIGRAM(S): 80 CAPSULE, EXTENDED RELEASE ORAL at 16:45

## 2017-05-16 RX ADMIN — AMLODIPINE BESYLATE 5 MILLIGRAM(S): 2.5 TABLET ORAL at 06:02

## 2017-05-16 RX ADMIN — SERTRALINE 50 MILLIGRAM(S): 25 TABLET, FILM COATED ORAL at 11:44

## 2017-05-16 RX ADMIN — Medication 100 MILLIGRAM(S): at 06:01

## 2017-05-16 RX ADMIN — HEPARIN SODIUM 5000 UNIT(S): 5000 INJECTION INTRAVENOUS; SUBCUTANEOUS at 15:05

## 2017-05-16 RX ADMIN — Medication 81 MILLIGRAM(S): at 11:44

## 2017-05-16 RX ADMIN — HEPARIN SODIUM 5000 UNIT(S): 5000 INJECTION INTRAVENOUS; SUBCUTANEOUS at 06:01

## 2017-05-16 RX ADMIN — HEPARIN SODIUM 5000 UNIT(S): 5000 INJECTION INTRAVENOUS; SUBCUTANEOUS at 22:18

## 2017-05-16 RX ADMIN — Medication 1 CAPSULE(S): at 11:47

## 2017-05-16 RX ADMIN — GABAPENTIN 300 MILLIGRAM(S): 400 CAPSULE ORAL at 22:19

## 2017-05-16 RX ADMIN — CARVEDILOL PHOSPHATE 12.5 MILLIGRAM(S): 80 CAPSULE, EXTENDED RELEASE ORAL at 06:01

## 2017-05-16 RX ADMIN — ISOSORBIDE MONONITRATE 60 MILLIGRAM(S): 60 TABLET, EXTENDED RELEASE ORAL at 11:44

## 2017-05-16 RX ADMIN — FLUOROMETHOLONE 1 DROP(S): 1 SOLUTION/ DROPS OPHTHALMIC at 16:45

## 2017-05-16 RX ADMIN — FLUOROMETHOLONE 1 DROP(S): 1 SOLUTION/ DROPS OPHTHALMIC at 06:02

## 2017-05-16 RX ADMIN — OXYCODONE HYDROCHLORIDE 5 MILLIGRAM(S): 5 TABLET ORAL at 08:41

## 2017-05-16 NOTE — PROGRESS NOTE ADULT - SUBJECTIVE AND OBJECTIVE BOX
HOSPITALIST PROGRESS NOTE:  SUBJECTIVE:  PCP:   Renal; Dr. 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 The patient was recently evaluated by her vascular surgeon Dr. Awadallah who diagnosed her w/ B/L UE thrombi on 5/4/2017 (of note the patients' last HD session was on Saturday). The patient was evaluated yesterday at [facility]. Per Renal attempt at AVF salvage was unsuccessful. Today the patient was referred to the ED from [facility] as the patient was noted to be increasingly lethargic, febrile, and had left arm swelling and erythema. In the ED the patient was given Ceftriaxone 1g IV x 1, Zosyn 3.375g IV x 1, and Vancomycin 1g IV x 1. The patient was admitted to the CCU for emergent dialysis and shiley catheter placement. Renal, Vascular Surgery, and ID were consulted in the ED. (09 May 2017 18:07)      Allergies:  No Known Allergies    REVIEW OF SYSTEMS:  See HPI. All other review of systems is negative unless indicated above.     OBJECTIVE  Physical Exam:  Vital Signs:    Vital Signs Last 24 Hrs  T(C): 36.4, Max: 37.2 (05-15 @ 17:10)  T(F): 97.6, Max: 98.9 (05-15 @ 17:10)  HR: 62 (54 - 78)  BP: 162/44 (107/49 - 162/44)  BP(mean): --  RR: 18 (17 - 18)  SpO2: 99% (94% - 100%)  I&O's Summary        Constitutional: NAD, awake and alert, well-developed  Neurological: AAO x 3, no focal deficits  HEENT: PERRLA, EOMI, MMM  Neck: Soft and supple, No LAD, No JVD  Respiratory: Breath sounds are clear bilaterally, No wheezing, rales or rhonchi  Cardiovascular: S1 and S2, regular rate and rhythm;, gallops or rubs; +Murmur  Gastrointestinal: Bowel Sounds present, soft, nontender, nondistended, no guarding, no rebound tenderness  Back: No CVA tenderness   Extremities: No peripheral edema  Vascular: 2+ peripheral pulses  Musculoskeletal: 5/5 strength b/l upper and lower extremities  Skin: No rashes  Breast: Deferred  Rectal: Deferred    MEDICATIONS  (STANDING):  aspirin enteric coated 81milliGRAM(s) Oral daily  isosorbide   mononitrate ER Tablet (IMDUR) 60milliGRAM(s) Oral daily  gabapentin 300milliGRAM(s) Oral at bedtime  sertraline 50milliGRAM(s) Oral daily  atorvastatin 10milliGRAM(s) Oral at bedtime  fluorometholone 0.1% Suspension 1Drop(s) Both EYES two times a day  heparin  Injectable 5000Unit(s) SubCutaneous every 8 hours  doxercalciferol Injectable 1MICROGram(s) IV Push <User Schedule>  carvedilol 12.5milliGRAM(s) Oral every 12 hours  epoetin hazel Injectable 8000Unit(s) IV Push <User Schedule>  Nephrocaps 1Capsule(s) Oral daily  amLODIPine   Tablet 5milliGRAM(s) Oral daily      LABS: All Labs Reviewed:                        11.0   8.3   )-----------( 181      ( 15 May 2017 08:50 )             35.4     05-15    139  |  102  |  55<H>  ----------------------------<  173<H>  4.6   |  24  |  6.32<H>    Ca    8.3<L>      15 May 2017 08:50  Phos  5.0     05-15    TPro  x   /  Alb  2.9<L>  /  TBili  x   /  DBili  x   /  AST  x   /  ALT  x   /  AlkPhos  x   05-15      Blood Culture:       RADIOLOGY/EKG:    EXAM:  CHEST SINGLE VIEW FRONTAL  05/13/2017    Impression: No active pulmonary disease.    EXAM:  US DPLX UPR EXT VEINS COMPL BI     05/10/2017    IMPRESSION:  No sonographic evidence for deep vein thrombosis in the   right and left upper extremity. Patent left brachiobasilic fistula graft.      EXAM:  CHEST SINGLE VIEW FRONTAL       05/11/2017    FINDINGS/  IMPRESSION:      Small left pleural effusion with underlying atelectasis and/or infiltrate   is unchanged. Median sternotomy. Left chest wall pacemaker in place.    Cardiomegaly present. HOSPITALIST PROGRESS NOTE:  SUBJECTIVE:  PCP:   Renal; Dr. 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 The patient was recently evaluated by her vascular surgeon Dr. Awadallah who diagnosed her w/ B/L UE thrombi on 5/4/2017 (of note the patients' last HD session was on Saturday). The patient was evaluated yesterday at [facility]. Per Renal attempt at AVF salvage was unsuccessful. Today the patient was referred to the ED from [facility] as the patient was noted to be increasingly lethargic, febrile, and had left arm swelling and erythema. In the ED the patient was given Ceftriaxone 1g IV x 1, Zosyn 3.375g IV x 1, and Vancomycin 1g IV x 1. The patient was admitted to the CCU for emergent dialysis and shiley catheter placement. Renal, Vascular Surgery, and ID were consulted in the ED. (09 May 2017 18:07)    5/16: patient still c/o headache. No N/V, numbness or tingling. was seen by palliative yesterday and patient would like to continue HD.     Allergies:  No Known Allergies    REVIEW OF SYSTEMS:  See HPI. All other review of systems is negative unless indicated above.     OBJECTIVE  Physical Exam:  Vital Signs:    Vital Signs Last 24 Hrs  T(C): 36.4, Max: 37.2 (05-15 @ 17:10)  T(F): 97.6, Max: 98.9 (05-15 @ 17:10)  HR: 62 (54 - 78)  BP: 162/44 (107/49 - 162/44)  BP(mean): --  RR: 18 (17 - 18)  SpO2: 99% (94% - 100%)  I&O's Summary        Constitutional: NAD, awake and alert, well-developed  Neurological: AAO x 3, no focal deficits  HEENT: PERRLA, EOMI, MMM  Neck: Soft and supple, No LAD, No JVD  Respiratory: Breath sounds are clear bilaterally, No wheezing, rales or rhonchi  Cardiovascular: S1 and S2, regular rate and rhythm;, gallops or rubs; +Murmur  Gastrointestinal: Bowel Sounds present, soft, nontender, nondistended, no guarding, no rebound tenderness  Back: No CVA tenderness   Extremities: +LUE peripheral edema  Vascular: 2+ peripheral pulses  Musculoskeletal: 5/5 strength b/l upper and lower extremities  Skin: No rashes  Breast: Deferred  Rectal: Deferred    MEDICATIONS  (STANDING):  aspirin enteric coated 81milliGRAM(s) Oral daily  isosorbide   mononitrate ER Tablet (IMDUR) 60milliGRAM(s) Oral daily  gabapentin 300milliGRAM(s) Oral at bedtime  sertraline 50milliGRAM(s) Oral daily  atorvastatin 10milliGRAM(s) Oral at bedtime  fluorometholone 0.1% Suspension 1Drop(s) Both EYES two times a day  heparin  Injectable 5000Unit(s) SubCutaneous every 8 hours  doxercalciferol Injectable 1MICROGram(s) IV Push <User Schedule>  carvedilol 12.5milliGRAM(s) Oral every 12 hours  epoetin hazel Injectable 8000Unit(s) IV Push <User Schedule>  Nephrocaps 1Capsule(s) Oral daily  amLODIPine   Tablet 5milliGRAM(s) Oral daily      LABS: All Labs Reviewed:                        11.0   8.3   )-----------( 181      ( 15 May 2017 08:50 )             35.4     05-15    139  |  102  |  55<H>  ----------------------------<  173<H>  4.6   |  24  |  6.32<H>    Ca    8.3<L>      15 May 2017 08:50  Phos  5.0     05-15    TPro  x   /  Alb  2.9<L>  /  TBili  x   /  DBili  x   /  AST  x   /  ALT  x   /  AlkPhos  x   05-15      Blood Culture:       RADIOLOGY/EKG:    EXAM:  CHEST SINGLE VIEW FRONTAL  05/13/2017    Impression: No active pulmonary disease.    EXAM:  US DPLX UPR EXT VEINS COMPL BI     05/10/2017    IMPRESSION:  No sonographic evidence for deep vein thrombosis in the   right and left upper extremity. Patent left brachiobasilic fistula graft.      EXAM:  CHEST SINGLE VIEW FRONTAL       05/11/2017    FINDINGS/  IMPRESSION:      Small left pleural effusion with underlying atelectasis and/or infiltrate   is unchanged. Median sternotomy. Left chest wall pacemaker in place.    Cardiomegaly present.

## 2017-05-16 NOTE — PROGRESS NOTE ADULT - SUBJECTIVE AND OBJECTIVE BOX
NEPHROLOGY INTERVAL HPI/OVERNIGHT EVENTS:  5/16 SY  Alert and oriented today.  Recognizes me without hesitancy.  Complains of not being given pudding etc.  Denies SOB.    5/11 SY  No acute event overnight.  Appears more comfortable.  Calm today and answering some questions appropriately.  Blood cultures negative so far.    5/10 SY  Awake but remains confused.  At baseline, pt is fully lucid and oriented.  No acute distress noted.    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  Pt has L UE avf, same side of AICD, with clotted UE bilaterally.. Pt recently seen by her surgeon Dr awadallah who dx her w bilat UE clots, she was also seen at [facility] yesterday, I spoke to Dr hearn.. He stated he could not get a through the clots adjacent to the PM wire to de clot the vein. he suggested ligating the avf may reduce the swelling..  Today arm is erythematous and pt is febrile, lethargic. IV abx  given in ER ( zosyn, rocephin, vanco)      MEDICATIONS  (STANDING):  aspirin enteric coated 81milliGRAM(s) Oral daily  isosorbide   mononitrate ER Tablet (IMDUR) 60milliGRAM(s) Oral daily  gabapentin 300milliGRAM(s) Oral at bedtime  sertraline 50milliGRAM(s) Oral daily  atorvastatin 10milliGRAM(s) Oral at bedtime  fluorometholone 0.1% Suspension 1Drop(s) Both EYES two times a day  heparin  Injectable 5000Unit(s) SubCutaneous every 8 hours  doxercalciferol Injectable 1MICROGram(s) IV Push <User Schedule>  carvedilol 12.5milliGRAM(s) Oral every 12 hours  epoetin hazel Injectable 8000Unit(s) IV Push <User Schedule>  Nephrocaps 1Capsule(s) Oral daily  amLODIPine   Tablet 5milliGRAM(s) Oral daily    MEDICATIONS  (PRN):  acetaminophen   Tablet 650milliGRAM(s) Oral every 6 hours PRN For Temp greater than 38 C (100.4 F)  acetaminophen   Tablet. 650milliGRAM(s) Oral every 6 hours PRN Mild Pain (1 - 3)  metoclopramide Injectable 10milliGRAM(s) IV Push once PRN Nausea and/or Vomiting  senna 2Tablet(s) Oral at bedtime PRN Constipation  docusate sodium 100milliGRAM(s) Oral three times a day PRN Constipation  HYDROmorphone   Tablet 1milliGRAM(s) Oral every 3 hours PRN pain or dyspnea  oxyCODONE IR 5milliGRAM(s) Oral every 6 hours PRN Moderate Pain (4 - 6)          Vital Signs Last 24 Hrs  T(C): 37.2, Max: 37.2 (05-15 @ 17:10)  T(F): 99, Max: 99 (05-16 @ 11:05)  HR: 63 (62 - 78)  BP: 142/36 (126/39 - 162/44)  BP(mean): --  RR: 17 (17 - 18)  SpO2: 95% (94% - 99%)  Daily     Daily       PHYSICAL EXAM:  Alert and appropriate  GENERAL: No apparent distress  CHEST/LUNG: scattered rhonchi  HEART: S1S2 RRR  ABDOMEN: soft  EXTREMITIES: no edema  SKIN:     LABS:                        11.0   8.3   )-----------( 181      ( 15 May 2017 08:50 )             35.4     05-15    139  |  102  |  55<H>  ----------------------------<  173<H>  4.6   |  24  |  6.32<H>    Ca    8.3<L>      15 May 2017 08:50  Phos  5.0     05-15    TPro  x   /  Alb  2.9<L>  /  TBili  x   /  DBili  x   /  AST  x   /  ALT  x   /  AlkPhos  x   05-15                RADIOLOGY & ADDITIONAL TESTS:

## 2017-05-16 NOTE — PROGRESS NOTE ADULT - ASSESSMENT
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  Pt has L UE avf, same side of AICD, with clotted UE bilaterally.. Pt recently seen by her surgeon Dr awadallah who dx her w bilat UE clots, she was also seen at [facility] yesterday, I spoke to Dr hearn.. He stated he could not get a through the clots adjacent to the PM wire to de clot the vein. he suggested ligating the avf may reduce the swelling..  Today arm is erythematous and pt is febrile, lethargic. IV abx  given in ER ( zosyn, rocephin, vanco)    D/W pts daughter, will place femoral line and dialyze today  ID consult, Dr Carrasco informed, suggested to place on Cefepime 1 g iv daily starting in am, after hd on hd days..  Dr De La O consulted for second opinion of av access..  lactate level w HD    5/11 SY  --ESRD   Tolerating tx fairly ok.  HD order and tx reviewed.  Will plan HD again in am with plans to remove femoral catheter post HD tomorrow.  D/W Dr William.  --HD access.   Plan for Permcath on monday for now.   Awaiting call back from Dr Peters re further plans for current AVF ligation and creation of a new AVF possibly.  --ID  : continue abtx for LUE cellulitis.  --AMS improving  --PVC  : follow up repeat CXR.  Will attempt to increase UF with HD.  BP borderline on HD today.   D/c Amlodipine and decrease dose of Coreg.    5/12 MK  - ESRD tolerating HD goal uf of 1 kg.  for removal of dialysis catheter post HD. Bp more stable with removal of HD  - LUE cellulitits: abx as per ID.    - LUE AVF:for ligation and Perm cath placement in AM  - Palliative input noted    5/13  comfortable eating   no complaints  for hd monday  R IJ cvc in place  5/14  pt earlier commented that she wants to stop hd but then stated she is considering it.  Daughter and  in the room  due for hd through R ij, which she states is uncomfortable  discussed with palliative care  will d/w dr de la o re site of perm access   5/15 MK  - ESRD: tolerating hd with goal uf of 2 kg on 2 k bath.  - s/p AVF ligation with perm cath, pt eager to have it removed.  agreeable to have a new access placed- palliative input noted  - AMS much improved    5/16 SY  --ESRD  Appreciate Palliative medicine follow up.  Pt wishes to continue HD  --S/p left arm AVF ligation.  Edema much improved.  --D/w Dr Peters.  To study Right central vein patency to determine whether AVF in right arm is a possibility.  --Mental status improved and stable

## 2017-05-16 NOTE — CHART NOTE - NSCHARTNOTEFT_GEN_A_CORE
Upon Nutritional Assessment by the Registered Dietitian your patient was determined to meet criteria has evidence of the following diagnosis/diagnoses:        [ ]  Mild Protein Calorie Malnutrition        [X]  Moderate Protein Calorie Malnutrition        [ ] Severe Protein Calorie Malnutrition        [ ] Unspecified Protein Calorie Malnutrition    Findings as based on:  •  Comprehensive nutrition assessment and Nutrition focused physical examination  •  Insufficient food/energy intake  •  Weight loss over time(Please specify time period)  •  Loss of muscle mass  •  Loss of fat mass  •  Fluid accumulation    Findings relevant to patient:  1. <50% PO intake > 5 days  2. Recent 6# weight loss (5%) possibly over 2-4 weeks  3 Hx of ESRD on HD and moderate temporal muscle wasting.      Nutrition Interventions:  1. Continue nepro 8 oz BID  2. Will add prosource daily  3. Consider liberalizing diet to regular and adding appetite stimulant     TO BE COMPLETED BY PROVIDER:          [ ] Agree:         [ ] Disagree:    Comments:

## 2017-05-16 NOTE — PROGRESS NOTE ADULT - ASSESSMENT
*AMS/Metabolic encephalopathy 2ndry to  Left arm cellulitis + Infected Left arm AVF: Stable hemodynamically  -transferred from [facility]  -S/P Cefepime and Vanco  -Monitor Off ABX as per ID  -Blood cx no growth    *?Clotted Left AVF + ESRD on HD  -appreciate Vascular Sx and renal consults  -being dialyzed thru temp access in right groin  -Venous doppler of b/l upper ext: patent Left AVF  -s/p RIJ cath placement and ligation of Left AVF on 5/13 by Dr. Morin.  -Dr. Morin to do right arm venogram for evaluation for access for AV fistula for HD  -Palliative care consult appreciated - patient wants to prroceed with Dialysis    *Occiptal Headache unsure of Etiology  -Neurologically intact  -If symptoms worsen will consider neuro consult and CT head    *HTN: Better today;  may be sec to component of right sided neck pain  -cont amlodipine, coreg, pain meds    *DM 2:  -ISS  -monitor accuchecks    *CAD:  -cont asa, statins, coreg    *Code Status: DNR and DNI    *Disposition: *AMS/Metabolic encephalopathy 2ndry to  Left arm cellulitis + Infected Left arm AVF: Stable hemodynamically  -transferred from [facility]  -S/P Cefepime and Vanco  -Monitor Off ABX as per ID  -Blood cx no growth    *?Clotted Left AVF + ESRD on HD  -appreciate Vascular Sx and renal consults  --Venous doppler of b/l upper ext: patent Left AVF   -s/p RIJ cath placement and ligation of Left AVF on 5/13 by Dr. Morin.  -Dr. Morin to do right arm venogram for evaluation for access for AV fistula for HD  -Palliative care consult appreciated - patient wants to proceed with Dialysis    *Occiptal Headache unsure of Etiology  -Neurologically intact  -FU neuro consult and CT head    *HTN: Better today;  may be sec to component of right sided neck pain  -cont amlodipine, coreg, pain meds    *DM 2:  -ISS  -monitor accuchecks    *CAD:  -cont asa, statins, coreg    *Code Status: DNR and DNI    *Disposition: Awaiting venogram;

## 2017-05-17 LAB
ALBUMIN SERPL ELPH-MCNC: 2.9 G/DL — LOW (ref 3.3–5)
ANION GAP SERPL CALC-SCNC: 12 MMOL/L — SIGNIFICANT CHANGE UP (ref 5–17)
BUN SERPL-MCNC: 51 MG/DL — HIGH (ref 7–23)
CALCIUM SERPL-MCNC: 8.5 MG/DL — SIGNIFICANT CHANGE UP (ref 8.5–10.1)
CHLORIDE SERPL-SCNC: 101 MMOL/L — SIGNIFICANT CHANGE UP (ref 96–108)
CO2 SERPL-SCNC: 26 MMOL/L — SIGNIFICANT CHANGE UP (ref 22–31)
CREAT SERPL-MCNC: 5.7 MG/DL — HIGH (ref 0.5–1.3)
GLUCOSE SERPL-MCNC: 332 MG/DL — HIGH (ref 70–99)
HCT VFR BLD CALC: 34.4 % — LOW (ref 34.5–45)
HGB BLD-MCNC: 10.6 G/DL — LOW (ref 11.5–15.5)
MCHC RBC-ENTMCNC: 30.6 PG — SIGNIFICANT CHANGE UP (ref 27–34)
MCHC RBC-ENTMCNC: 30.7 GM/DL — LOW (ref 32–36)
MCV RBC AUTO: 100 FL — SIGNIFICANT CHANGE UP (ref 80–100)
PHOSPHATE SERPL-MCNC: 5.5 MG/DL — HIGH (ref 2.5–4.5)
PLATELET # BLD AUTO: 202 K/UL — SIGNIFICANT CHANGE UP (ref 150–400)
POTASSIUM SERPL-MCNC: 4.2 MMOL/L — SIGNIFICANT CHANGE UP (ref 3.5–5.3)
POTASSIUM SERPL-SCNC: 4.2 MMOL/L — SIGNIFICANT CHANGE UP (ref 3.5–5.3)
RBC # BLD: 3.44 M/UL — LOW (ref 3.8–5.2)
RBC # FLD: 15.9 % — HIGH (ref 10.3–14.5)
SODIUM SERPL-SCNC: 139 MMOL/L — SIGNIFICANT CHANGE UP (ref 135–145)
WBC # BLD: 9.8 K/UL — SIGNIFICANT CHANGE UP (ref 3.8–10.5)
WBC # FLD AUTO: 9.8 K/UL — SIGNIFICANT CHANGE UP (ref 3.8–10.5)

## 2017-05-17 PROCEDURE — 99222 1ST HOSP IP/OBS MODERATE 55: CPT

## 2017-05-17 RX ORDER — DEXTROSE 50 % IN WATER 50 %
1 SYRINGE (ML) INTRAVENOUS ONCE
Qty: 0 | Refills: 0 | Status: DISCONTINUED | OUTPATIENT
Start: 2017-05-17 | End: 2017-05-19

## 2017-05-17 RX ORDER — SODIUM CHLORIDE 9 MG/ML
1000 INJECTION, SOLUTION INTRAVENOUS
Qty: 0 | Refills: 0 | Status: DISCONTINUED | OUTPATIENT
Start: 2017-05-17 | End: 2017-05-19

## 2017-05-17 RX ORDER — DEXTROSE 50 % IN WATER 50 %
25 SYRINGE (ML) INTRAVENOUS ONCE
Qty: 0 | Refills: 0 | Status: DISCONTINUED | OUTPATIENT
Start: 2017-05-17 | End: 2017-05-19

## 2017-05-17 RX ORDER — LIDOCAINE 4 G/100G
1 CREAM TOPICAL DAILY
Qty: 0 | Refills: 0 | Status: DISCONTINUED | OUTPATIENT
Start: 2017-05-17 | End: 2017-05-19

## 2017-05-17 RX ORDER — GLUCAGON INJECTION, SOLUTION 0.5 MG/.1ML
1 INJECTION, SOLUTION SUBCUTANEOUS ONCE
Qty: 0 | Refills: 0 | Status: DISCONTINUED | OUTPATIENT
Start: 2017-05-17 | End: 2017-05-19

## 2017-05-17 RX ORDER — DEXTROSE 50 % IN WATER 50 %
12.5 SYRINGE (ML) INTRAVENOUS ONCE
Qty: 0 | Refills: 0 | Status: DISCONTINUED | OUTPATIENT
Start: 2017-05-17 | End: 2017-05-19

## 2017-05-17 RX ORDER — INSULIN LISPRO 100/ML
VIAL (ML) SUBCUTANEOUS
Qty: 0 | Refills: 0 | Status: DISCONTINUED | OUTPATIENT
Start: 2017-05-17 | End: 2017-05-19

## 2017-05-17 RX ORDER — INSULIN LISPRO 100/ML
VIAL (ML) SUBCUTANEOUS AT BEDTIME
Qty: 0 | Refills: 0 | Status: DISCONTINUED | OUTPATIENT
Start: 2017-05-17 | End: 2017-05-19

## 2017-05-17 RX ADMIN — ATORVASTATIN CALCIUM 10 MILLIGRAM(S): 80 TABLET, FILM COATED ORAL at 22:19

## 2017-05-17 RX ADMIN — GABAPENTIN 300 MILLIGRAM(S): 400 CAPSULE ORAL at 22:19

## 2017-05-17 RX ADMIN — HYDROMORPHONE HYDROCHLORIDE 1 MILLIGRAM(S): 2 INJECTION INTRAMUSCULAR; INTRAVENOUS; SUBCUTANEOUS at 12:05

## 2017-05-17 RX ADMIN — FLUOROMETHOLONE 1 DROP(S): 1 SOLUTION/ DROPS OPHTHALMIC at 17:05

## 2017-05-17 RX ADMIN — CARVEDILOL PHOSPHATE 12.5 MILLIGRAM(S): 80 CAPSULE, EXTENDED RELEASE ORAL at 05:59

## 2017-05-17 RX ADMIN — AMLODIPINE BESYLATE 5 MILLIGRAM(S): 2.5 TABLET ORAL at 05:59

## 2017-05-17 RX ADMIN — FLUOROMETHOLONE 1 DROP(S): 1 SOLUTION/ DROPS OPHTHALMIC at 06:00

## 2017-05-17 RX ADMIN — HYDROMORPHONE HYDROCHLORIDE 1 MILLIGRAM(S): 2 INJECTION INTRAMUSCULAR; INTRAVENOUS; SUBCUTANEOUS at 15:47

## 2017-05-17 RX ADMIN — HEPARIN SODIUM 5000 UNIT(S): 5000 INJECTION INTRAVENOUS; SUBCUTANEOUS at 22:19

## 2017-05-17 RX ADMIN — HYDROMORPHONE HYDROCHLORIDE 1 MILLIGRAM(S): 2 INJECTION INTRAMUSCULAR; INTRAVENOUS; SUBCUTANEOUS at 22:20

## 2017-05-17 RX ADMIN — CARVEDILOL PHOSPHATE 12.5 MILLIGRAM(S): 80 CAPSULE, EXTENDED RELEASE ORAL at 17:04

## 2017-05-17 RX ADMIN — SERTRALINE 50 MILLIGRAM(S): 25 TABLET, FILM COATED ORAL at 11:05

## 2017-05-17 RX ADMIN — Medication 3: at 17:57

## 2017-05-17 RX ADMIN — HEPARIN SODIUM 5000 UNIT(S): 5000 INJECTION INTRAVENOUS; SUBCUTANEOUS at 05:59

## 2017-05-17 RX ADMIN — ISOSORBIDE MONONITRATE 60 MILLIGRAM(S): 60 TABLET, EXTENDED RELEASE ORAL at 15:46

## 2017-05-17 RX ADMIN — HYDROMORPHONE HYDROCHLORIDE 1 MILLIGRAM(S): 2 INJECTION INTRAMUSCULAR; INTRAVENOUS; SUBCUTANEOUS at 23:00

## 2017-05-17 RX ADMIN — LIDOCAINE 1 PATCH: 4 CREAM TOPICAL at 17:01

## 2017-05-17 RX ADMIN — HYDROMORPHONE HYDROCHLORIDE 1 MILLIGRAM(S): 2 INJECTION INTRAMUSCULAR; INTRAVENOUS; SUBCUTANEOUS at 16:47

## 2017-05-17 RX ADMIN — Medication 81 MILLIGRAM(S): at 11:05

## 2017-05-17 RX ADMIN — HYDROMORPHONE HYDROCHLORIDE 1 MILLIGRAM(S): 2 INJECTION INTRAMUSCULAR; INTRAVENOUS; SUBCUTANEOUS at 11:05

## 2017-05-17 RX ADMIN — Medication 1 CAPSULE(S): at 11:07

## 2017-05-17 NOTE — PROGRESS NOTE ADULT - SUBJECTIVE AND OBJECTIVE BOX
HOSPITALIST PROGRESS NOTE:  SUBJECTIVE:  PCP:   Renal; Dr. 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 The patient was recently evaluated by her vascular surgeon Dr. Awadallah who diagnosed her w/ B/L UE thrombi on 5/4/2017 (of note the patients' last HD session was on Saturday). The patient was evaluated yesterday at [facility]. Per Renal attempt at AVF salvage was unsuccessful. Today the patient was referred to the ED from [facility] as the patient was noted to be increasingly lethargic, febrile, and had left arm swelling and erythema. In the ED the patient was given Ceftriaxone 1g IV x 1, Zosyn 3.375g IV x 1, and Vancomycin 1g IV x 1. The patient was admitted to the CCU for emergent dialysis and shiley catheter placement. Renal, Vascular Surgery, and ID were consulted in the ED. (09 May 2017 18:07)    5/16: patient still c/o headache. No N/V, numbness or tingling. was seen by palliative yesterday and patient would like to continue HD.   5/17: s/p venogram. Lying in bed, weak and lethargic. Pt states she is tired.  Unable to provide good history.  Denies any specific complaints, no fever, chills, N, V, abd pain.      REVIEW OF SYSTEMS: See HPI. All other review of systems is negative unless indicated above.     Vital Signs Last 24 Hrs  T(C): 36.8, Max: 36.8 (05-16 @ 22:00)  T(F): 98.3, Max: 98.3 (05-17 @ 16:20)  HR: 76 (69 - 77)  BP: 155/51 (153/51 - 169/57)  BP(mean): --  RR: 18 (18 - 19)  SpO2: 98% (97% - 100%)      Constitutional: NAD, frail, elderly, weak, lethargic  Neurological: AAO x 3, no focal deficits  HEENT: PERRLA, EOMI, MMM  Neck: Soft and supple, No LAD, No JVD  Respiratory: Breath sounds are clear bilaterally, No wheezing, rales or rhonchi  Cardiovascular: S1 and S2, regular rate and rhythm;, gallops or rubs; +Murmur  Gastrointestinal: Bowel Sounds present, soft, nontender, nondistended, no guarding, no rebound tenderness  Back: No CVA tenderness   Extremities: +LUE peripheral edema  Vascular: 2+ peripheral pulses  Musculoskeletal: 5/5 strength b/l upper and lower extremities  Skin: No rashes    MEDICATIONS  (STANDING):  aspirin enteric coated 81milliGRAM(s) Oral daily  isosorbide   mononitrate ER Tablet (IMDUR) 60milliGRAM(s) Oral daily  gabapentin 300milliGRAM(s) Oral at bedtime  sertraline 50milliGRAM(s) Oral daily  atorvastatin 10milliGRAM(s) Oral at bedtime  fluorometholone 0.1% Suspension 1Drop(s) Both EYES two times a day  heparin  Injectable 5000Unit(s) SubCutaneous every 8 hours  doxercalciferol Injectable 1MICROGram(s) IV Push <User Schedule>  carvedilol 12.5milliGRAM(s) Oral every 12 hours  epoetin hazel Injectable 8000Unit(s) IV Push <User Schedule>  Nephrocaps 1Capsule(s) Oral daily  amLODIPine   Tablet 5milliGRAM(s) Oral daily  lidocaine   Patch 1Patch Transdermal daily  insulin lispro (HumaLOG) corrective regimen sliding scale  SubCutaneous three times a day before meals  insulin lispro (HumaLOG) corrective regimen sliding scale  SubCutaneous at bedtime  dextrose 5%. 1000milliLiter(s) IV Continuous <Continuous>  dextrose 50% Injectable 12.5Gram(s) IV Push once  dextrose 50% Injectable 25Gram(s) IV Push once  dextrose 50% Injectable 25Gram(s) IV Push once    MEDICATIONS  (PRN):  acetaminophen   Tablet 650milliGRAM(s) Oral every 6 hours PRN For Temp greater than 38 C (100.4 F)  acetaminophen   Tablet. 650milliGRAM(s) Oral every 6 hours PRN Mild Pain (1 - 3)  metoclopramide Injectable 10milliGRAM(s) IV Push once PRN Nausea and/or Vomiting  senna 2Tablet(s) Oral at bedtime PRN Constipation  docusate sodium 100milliGRAM(s) Oral three times a day PRN Constipation  HYDROmorphone   Tablet 1milliGRAM(s) Oral every 3 hours PRN pain or dyspnea  oxyCODONE IR 5milliGRAM(s) Oral every 6 hours PRN Moderate Pain (4 - 6)  dextrose Gel 1Dose(s) Oral once PRN Blood Glucose LESS THAN 70 milliGRAM(s)/deciliter  glucagon  Injectable 1milliGRAM(s) IntraMuscular once PRN Glucose LESS THAN 70 milligrams/deciliter                              10.6   9.8   )-----------( 202      ( 17 May 2017 07:44 )             34.4     05-17    139  |  101  |  51<H>  ----------------------------<  332<H>  4.2   |  26  |  5.70<H>    Ca    8.5      17 May 2017 07:44  Phos  5.5     05-17    TPro  x   /  Alb  2.9<L>  /  TBili  x   /  DBili  x   /  AST  x   /  ALT  x   /  AlkPhos  x   05-17    CAPILLARY BLOOD GLUCOSE  262 (17 May 2017 17:08)    LIVER FUNCTIONS - ( 17 May 2017 07:44 )  Alb: 2.9 g/dL / Pro: x     / ALK PHOS: x     / ALT: x     / AST: x     / GGT: x             Assessment and Plan:   		    *AMS/Metabolic encephalopathy 2ndry to  Left arm cellulitis + Infected Left arm AVF: Stable hemodynamically  -transferred from [facility]  -S/P Cefepime and Vanco  -Monitor Off ABX as per ID  -Blood cx no growth    *?Clotted Left AVF + ESRD on HD  -appreciate Vascular Sx and renal consults  --Venous doppler of b/l upper ext: patent Left AVF   -s/p RIJ cath placement and ligation of Left AVF on 5/13 by Dr. Morin.  -s/p right arm venogram for evaluation for access for AV fistula for HD  -Palliative care consult appreciated - patient wants to proceed with Dialysis    *Occiptal Headache unsure of Etiology  -Neurologically intact  -CT head -->  Encephalomalacia and gliosis in the BILATERAL occipital and RIGHT parietal lobes, sequela of prior insult likely infarctions. There is moderate periventricular white matter ischemia.       Moderate mucosal thickening in the LEFT ethmoid and sphenoid sinus    *HTN: Acceptable  -cont amlodipine, coreg, pain meds    *DM 2:  -ISS  -monitor accuchecks    *CAD:  -cont asa, statins, coreg    *Code Status: DNR and DNI      *AMS/Metabolic encephalopathy 2ndry to  Left arm cellulitis + Infected Left arm AVF: Stable hemodynamically  -transferred from [facility]  -S/P Cefepime and Vanco  -Monitor Off ABX as per ID  -Blood cx no growth    *?Clotted Left AVF + ESRD on HD  -appreciate Vascular Sx and renal consults  -being dialyzed thru temp access in right groin  -Venous doppler of b/l upper ext: patent Left AVF  -s/p RIJ cath placement and ligation of Left AVF on 5/13 by Dr. Morin.  -Dr. Morin to do right arm venogram for evaluation for access for AV fistula for HD  -Palliative care consult appreciated - patient wants to prroceed with Dialysis    *Occiptal Headache unsure of Etiology  -Neurologically intact  -If symptoms worsen will consider neuro consult and CT head    *HTN: Better today;  may be sec to component of right sided neck pain  -cont amlodipine, coreg, pain meds    *DM 2:  -ISS  -monitor accuchecks    *CAD:  -cont asa, statins, coreg    *Code Status: DNR and DNI    *Disposition:    Attending Statement:  >35 minutes spent on total encounter; more than 50% of the visit was spent counseling and/or coordinating care by the attending physician.

## 2017-05-17 NOTE — PROGRESS NOTE ADULT - SUBJECTIVE AND OBJECTIVE BOX
NEPHROLOGY INTERVAL HPI/OVERNIGHT EVENTS:  no c/o doing well  await venogram      MEDICATIONS  (STANDING):  aspirin enteric coated 81milliGRAM(s) Oral daily  isosorbide   mononitrate ER Tablet (IMDUR) 60milliGRAM(s) Oral daily  gabapentin 300milliGRAM(s) Oral at bedtime  sertraline 50milliGRAM(s) Oral daily  atorvastatin 10milliGRAM(s) Oral at bedtime  fluorometholone 0.1% Suspension 1Drop(s) Both EYES two times a day  heparin  Injectable 5000Unit(s) SubCutaneous every 8 hours  doxercalciferol Injectable 1MICROGram(s) IV Push <User Schedule>  carvedilol 12.5milliGRAM(s) Oral every 12 hours  epoetin hazel Injectable 8000Unit(s) IV Push <User Schedule>  Nephrocaps 1Capsule(s) Oral daily  amLODIPine   Tablet 5milliGRAM(s) Oral daily  lidocaine   Patch 1Patch Transdermal daily    MEDICATIONS  (PRN):  acetaminophen   Tablet 650milliGRAM(s) Oral every 6 hours PRN For Temp greater than 38 C (100.4 F)  acetaminophen   Tablet. 650milliGRAM(s) Oral every 6 hours PRN Mild Pain (1 - 3)  metoclopramide Injectable 10milliGRAM(s) IV Push once PRN Nausea and/or Vomiting  senna 2Tablet(s) Oral at bedtime PRN Constipation  docusate sodium 100milliGRAM(s) Oral three times a day PRN Constipation  HYDROmorphone   Tablet 1milliGRAM(s) Oral every 3 hours PRN pain or dyspnea  oxyCODONE IR 5milliGRAM(s) Oral every 6 hours PRN Moderate Pain (4 - 6)      Allergies    No Known Allergies    Intolerances        I&O's Detail      .        Vital Signs Last 24 Hrs  T(C): 36.4, Max: 36.8 (05-16 @ 22:00)  T(F): 97.5, Max: 98.2 (05-16 @ 22:00)  HR: 77 (69 - 77)  BP: 169/57 (151/53 - 169/57)  BP(mean): --  RR: 18 (18 - 19)  SpO2: 97% (97% - 100%)  Daily     Daily     PHYSICAL EXAM:  General: alert. awake Ox3  HEENT: MMM  CV: s1s2 rrr  LUNGS: B/L CTA  EXT: no edema    LABS:                        10.6   9.8   )-----------( 202      ( 17 May 2017 07:44 )             34.4     05-17    139  |  101  |  51<H>  ----------------------------<  332<H>  4.2   |  26  |  5.70<H>    Ca    8.5      17 May 2017 07:44  Phos  5.5     05-17    TPro  x   /  Alb  2.9<L>  /  TBili  x   /  DBili  x   /  AST  x   /  ALT  x   /  AlkPhos  x   05-17        Phosphorus Level, Serum: 5.5 mg/dL (05-17 @ 07:44)

## 2017-05-17 NOTE — CONSULT NOTE ADULT - SUBJECTIVE AND OBJECTIVE BOX
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 The patient was recently diagnosed her w/ B/L UE thrombi on 5/4/2017. On day of admission the patient was noted to be increasingly lethargic, febrile, and had left arm swelling and erythema. In the ED the patient was given Ceftriaxone 1g IV x 1, Zosyn 3.375g IV x 1, and Vancomycin 1g IV x 1. The patient was admitted to the CCU for emergent dialysis and shiley catheter placement. Renal, Vascular Surgery, and ID were consulted in the ED.  After placement of right sided shiley cath/central line, pt began to c/o headache on the right occipital area of her head.     At time of exam pt is awake and alert, she is oriented to person, place, and time. She c/o occipital headache which she states started after the central line placement. Pt denies visual changes, she denies nausea or vomiting, no chest pain or difficulty breathing. Pt denies any numbness of her extremities, no changes in strength or sensation. CT of the head showed no acute stroke or hemorrhage       PAST MEDICAL & SURGICAL HISTORY:  AICD (automatic cardioverter/defibrillator) present  AVF (arteriovenous fistula): Left  DM2 (diabetes mellitus, type 2)  CVA (cerebral vascular accident)  MI (myocardial infarction)  CAD (coronary artery disease)  ESRD on hemodialysis  Cervical carcinoma  b/l upper extremity DVT    H/O bilateral oophorectomy  S/P BKA (below knee amputation) unilateral, right  S/P CABG (coronary artery bypass graft)  S/P AICD/PPM    FAMILY HISTORY: No pertinent family history in first degree relatives     Social Hx:  Lives at [facility], nonsmoker, no drug or alcohol use  MEDICATIONS  (STANDING):  aspirin enteric coated 81milliGRAM(s) Oral daily  isosorbide   mononitrate ER Tablet (IMDUR) 60milliGRAM(s) Oral daily  gabapentin 300milliGRAM(s) Oral at bedtime  sertraline 50milliGRAM(s) Oral daily  atorvastatin 10milliGRAM(s) Oral at bedtime  fluorometholone 0.1% Suspension 1Drop(s) Both EYES two times a day  heparin  Injectable 5000Unit(s) SubCutaneous every 8 hours  doxercalciferol Injectable 1MICROGram(s) IV Push <User Schedule>  carvedilol 12.5milliGRAM(s) Oral every 12 hours  epoetin hazel Injectable 8000Unit(s) IV Push <User Schedule>  Nephrocaps 1Capsule(s) Oral daily  amLODIPine   Tablet 5milliGRAM(s) Oral daily     Allergies: No Known Allergies    ROS: Pertinent positives in HPI, all other ROS were reviewed and are negative.      Vital Signs Last 24 Hrs  T(C): 36.4, Max: 37.2 (05-16 @ 11:05)  T(F): 97.5, Max: 99 (05-16 @ 11:05)  HR: 77 (63 - 77)  BP: 169/57 (142/36 - 169/57)  RR: 18 (17 - 19)  SpO2: 97% (95% - 100%)      PHYSICAL EXAM:  Constitutional: awake and alert.  HEENT: PERRLA, EOMI   Neck: Supple.  Respiratory: Breath sounds are clear bilaterally  Cardiovascular: S1 and S2, regular rhythm  Gastrointestinal: soft, nontender  Extremities:  s/p RIGHT BKA  Vascular: s/p right cental line placement, left upper extremity swelling   Musculoskeletal: right BKA amputation, decreased ROM LUE  Skin: multiple ecchymotic areas on chest     Neurological exam:  HF: A x O x 3. Appropriately interactive, normal affect. Speech fluent, No Aphasia or paraphasic errors. Naming /repetition intact   CN: pupils round and reactive, EOMI, VFF, facial sensation normal, no NLFD, tongue midline, Palate moves equally, SCM equal bilaterally  Motor: No pronator drift, equal strength b/l upper extremities, Rt BKA, Left leg 5/5  Sens: Intact to light touch / PP/ VS/ JS    Reflexes: reflexes depressed throughout, downgoing toes on left   Coord:  No FNFA, dysmetria, SHON intact   Gait/Balance: Not tested, pt non-ambulatory due to Right BKA     Labs:                        10.6   9.8   )-----------( 202      ( 17 May 2017 07:44 )             34.4     05-17    139  |  101  |  51<H>  ----------------------------<  332<H>  4.2   |  26  |  5.70<H>    Ca    8.5      17 May 2017 07:44  Phos  5.5     05-17    TPro  x   /  Alb  2.9<L>  /  TBili  x   /  DBili  x   /  AST  x   /  ALT  x   /  AlkPhos  x   05-17    Radiology:  CT Head:  IMPRESSION:   Encephalomalacia and gliosis in the BILATERAL occipital and   RIGHT parietal lobes, sequela of prior insult likely infarctions. There   is moderate periventricular white matter ischemia. Moderate mucosal   thickening in the LEFT ethmoid and sphenoid sinus.

## 2017-05-17 NOTE — CONSULT NOTE ADULT - ASSESSMENT
Pt is a 71 year old woman with above stated medical history who was admitted for sepsis at her hemodialysis site, she is DNR/DNI and is being followed by palliative care. Pt c/o headache which she states started after placement of her central line for dialysis access. CT head shows no hemorrhage or new infarct. Unable to get MRI due to AICD/PPM and unable to get CTA of head or neck due to advanced renal disease.    #headache, multifactorial  recommend adequate pain control and hydration  no other neurologic workup at this time given  - 71 year old woman with extensive PMH as stated in HPI, admitted for sepsis, she is DNR/DNI and is being followed by palliative care. Pt c/o headache after placement of her central line for dialysis access. CT head shows no hemorrhage or new infarct. Unable to get MRI due to AICD/PPM and unable to get CTA of head or neck due to advanced renal disease.    # Cephalgia / headache, possibility of occipital neuralgia; less likley CNS lesion given no signs of meningismus or focality    - recommend adequate pain control and hydration  - Unable to get MRI due to AICD, CTA of head or neck may be considered if necessary; caution due to advanced renal disease.  - no other neurologic workup at this time; if no resolution , consider adding Gabapentin    Neurology attending  ------------------------------  Pt seen and examined  Agree with above plan 71 year old woman with extensive PMH as stated in HPI, admitted for sepsis, she is DNR/DNI and is being followed by palliative care. Pt c/o headache after placement of her central line for dialysis access. CT head shows no hemorrhage or new infarct. Unable to get MRI due to AICD/PPM and unable to get CTA of head or neck due to advanced renal disease.    # Cephalgia / headache, possibility of occipital neuralgia; less likley CNS lesion given no signs of meningismus or focality    - recommend adequate pain control and hydration  - add lidocaine patch to right side of posterior neck daily   - Unable to get MRI due to AICD, CTA of head or neck may be considered if necessary; caution due to advanced renal disease.  - no other neurologic workup at this time; if no resolution , consider adding Gabapentin    Neurology attending  ------------------------------  Pt seen and examined  Agree with above plan

## 2017-05-17 NOTE — PROGRESS NOTE ADULT - ASSESSMENT
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  Pt has L UE avf, same side of AICD, with clotted UE bilaterally.. Pt recently seen by her surgeon Dr awadallah who dx her w bilat UE clots, she was also seen at [facility] yesterday, I spoke to Dr hearn.. He stated he could not get a through the clots adjacent to the PM wire to de clot the vein. he suggested ligating the avf may reduce the swelling..  Today arm is erythematous and pt is febrile, lethargic. IV abx  given in ER ( zosyn, rocephin, vanco)    D/W pts daughter, will place femoral line and dialyze today  ID consult, Dr Carrasco informed, suggested to place on Cefepime 1 g iv daily starting in am, after hd on hd days..  Dr De La O consulted for second opinion of av access..  lactate level w HD    5/11 SY  --ESRD   Tolerating tx fairly ok.  HD order and tx reviewed.  Will plan HD again in am with plans to remove femoral catheter post HD tomorrow.  D/W Dr William.  --HD access.   Plan for Permcath on monday for now.   Awaiting call back from Dr Peters re further plans for current AVF ligation and creation of a new AVF possibly.  --ID  : continue abtx for LUE cellulitis.  --AMS improving  --PVC  : follow up repeat CXR.  Will attempt to increase UF with HD.  BP borderline on HD today.   D/c Amlodipine and decrease dose of Coreg.  5/12 MK  - ESRD tolerating HD goal uf of 1 kg.  for removal of dialysis catheter post HD. Bp more stable with removal of HD  - LUE cellulitits: abx as per ID.    - LUE AVF:for ligation and Perm cath placement in AM  - Palliative input noted  5/13  comfortable eating   no complaints  for hd monday  R IJ cvc in place  5/14  pt earlier commented that she wants to stop hd but then stated she is considering it.  Daughter and  in the room  due for hd through R ij, which she states is uncomfortable  discussed with palliative care  will d/w dr de la o re site of perm access   5/15 MK  - ESRD: tolerating hd with goal uf of 2 kg on 2 k bath.  - s/p AVF ligation with perm cath, pt eager to have it removed.  agreeable to have a new access placed- palliative input noted  - AMS much improved  5/16 SY  --ESRD  Appreciate Palliative medicine follow up.  Pt wishes to continue HD  --S/p left arm AVF ligation.  Edema much improved.  --D/w Dr Peters.  To study Right central vein patency to determine whether AVF in right arm is a possibility.  --Mental status improved and stable    5/17 MK  - ESRD: for hd after venogram today.  electrolytes and respiratory status stable  - S/p avf ligation

## 2017-05-17 NOTE — CONSULT NOTE ADULT - CONSULT REQUESTED DATE/TIME
09-May-2017 18:44
09-May-2017 15:49
10-May-2017
10-May-2017 08:01
17-May-2017 10:48
09-May-2017 17:42

## 2017-05-18 LAB
ALBUMIN SERPL ELPH-MCNC: 2.9 G/DL — LOW (ref 3.3–5)
ANION GAP SERPL CALC-SCNC: 13 MMOL/L — SIGNIFICANT CHANGE UP (ref 5–17)
BUN SERPL-MCNC: 72 MG/DL — HIGH (ref 7–23)
CALCIUM SERPL-MCNC: 8.8 MG/DL — SIGNIFICANT CHANGE UP (ref 8.5–10.1)
CHLORIDE SERPL-SCNC: 103 MMOL/L — SIGNIFICANT CHANGE UP (ref 96–108)
CO2 SERPL-SCNC: 24 MMOL/L — SIGNIFICANT CHANGE UP (ref 22–31)
CREAT SERPL-MCNC: 7 MG/DL — HIGH (ref 0.5–1.3)
GLUCOSE SERPL-MCNC: 197 MG/DL — HIGH (ref 70–99)
HBA1C BLD-MCNC: 6.3 % — HIGH (ref 4–5.6)
HCT VFR BLD CALC: 35 % — SIGNIFICANT CHANGE UP (ref 34.5–45)
HGB BLD-MCNC: 10.8 G/DL — LOW (ref 11.5–15.5)
MAGNESIUM SERPL-MCNC: 2.4 MG/DL — SIGNIFICANT CHANGE UP (ref 1.6–2.6)
MCHC RBC-ENTMCNC: 30.6 PG — SIGNIFICANT CHANGE UP (ref 27–34)
MCHC RBC-ENTMCNC: 30.7 GM/DL — LOW (ref 32–36)
MCV RBC AUTO: 99.5 FL — SIGNIFICANT CHANGE UP (ref 80–100)
PHOSPHATE SERPL-MCNC: 6.9 MG/DL — HIGH (ref 2.5–4.5)
PLATELET # BLD AUTO: 192 K/UL — SIGNIFICANT CHANGE UP (ref 150–400)
POTASSIUM SERPL-MCNC: 4.9 MMOL/L — SIGNIFICANT CHANGE UP (ref 3.5–5.3)
POTASSIUM SERPL-SCNC: 4.9 MMOL/L — SIGNIFICANT CHANGE UP (ref 3.5–5.3)
RBC # BLD: 3.52 M/UL — LOW (ref 3.8–5.2)
RBC # FLD: 15.9 % — HIGH (ref 10.3–14.5)
SODIUM SERPL-SCNC: 140 MMOL/L — SIGNIFICANT CHANGE UP (ref 135–145)
WBC # BLD: 11 K/UL — HIGH (ref 3.8–10.5)
WBC # FLD AUTO: 11 K/UL — HIGH (ref 3.8–10.5)

## 2017-05-18 PROCEDURE — 71010: CPT | Mod: 26

## 2017-05-18 RX ORDER — SEVELAMER CARBONATE 2400 MG/1
800 POWDER, FOR SUSPENSION ORAL
Qty: 0 | Refills: 0 | Status: DISCONTINUED | OUTPATIENT
Start: 2017-05-18 | End: 2017-05-19

## 2017-05-18 RX ADMIN — GABAPENTIN 300 MILLIGRAM(S): 400 CAPSULE ORAL at 22:23

## 2017-05-18 RX ADMIN — FLUOROMETHOLONE 1 DROP(S): 1 SOLUTION/ DROPS OPHTHALMIC at 06:36

## 2017-05-18 RX ADMIN — SEVELAMER CARBONATE 800 MILLIGRAM(S): 2400 POWDER, FOR SUSPENSION ORAL at 13:29

## 2017-05-18 RX ADMIN — HYDROMORPHONE HYDROCHLORIDE 1 MILLIGRAM(S): 2 INJECTION INTRAMUSCULAR; INTRAVENOUS; SUBCUTANEOUS at 13:16

## 2017-05-18 RX ADMIN — LIDOCAINE 1 PATCH: 4 CREAM TOPICAL at 07:11

## 2017-05-18 RX ADMIN — Medication 1: at 13:20

## 2017-05-18 RX ADMIN — HYDROMORPHONE HYDROCHLORIDE 1 MILLIGRAM(S): 2 INJECTION INTRAMUSCULAR; INTRAVENOUS; SUBCUTANEOUS at 13:44

## 2017-05-18 RX ADMIN — DOXERCALCIFEROL 1 MICROGRAM(S): 2.5 CAPSULE ORAL at 12:04

## 2017-05-18 RX ADMIN — LIDOCAINE 1 PATCH: 4 CREAM TOPICAL at 13:19

## 2017-05-18 RX ADMIN — SEVELAMER CARBONATE 800 MILLIGRAM(S): 2400 POWDER, FOR SUSPENSION ORAL at 16:27

## 2017-05-18 RX ADMIN — CARVEDILOL PHOSPHATE 12.5 MILLIGRAM(S): 80 CAPSULE, EXTENDED RELEASE ORAL at 17:46

## 2017-05-18 RX ADMIN — HEPARIN SODIUM 5000 UNIT(S): 5000 INJECTION INTRAVENOUS; SUBCUTANEOUS at 13:28

## 2017-05-18 RX ADMIN — Medication 2: at 08:15

## 2017-05-18 RX ADMIN — HYDROMORPHONE HYDROCHLORIDE 1 MILLIGRAM(S): 2 INJECTION INTRAMUSCULAR; INTRAVENOUS; SUBCUTANEOUS at 10:15

## 2017-05-18 RX ADMIN — Medication 1 CAPSULE(S): at 16:26

## 2017-05-18 RX ADMIN — HEPARIN SODIUM 5000 UNIT(S): 5000 INJECTION INTRAVENOUS; SUBCUTANEOUS at 22:23

## 2017-05-18 RX ADMIN — HEPARIN SODIUM 5000 UNIT(S): 5000 INJECTION INTRAVENOUS; SUBCUTANEOUS at 06:36

## 2017-05-18 RX ADMIN — ERYTHROPOIETIN 8000 UNIT(S): 10000 INJECTION, SOLUTION INTRAVENOUS; SUBCUTANEOUS at 12:04

## 2017-05-18 RX ADMIN — ISOSORBIDE MONONITRATE 60 MILLIGRAM(S): 60 TABLET, EXTENDED RELEASE ORAL at 13:18

## 2017-05-18 RX ADMIN — SERTRALINE 50 MILLIGRAM(S): 25 TABLET, FILM COATED ORAL at 13:18

## 2017-05-18 RX ADMIN — ATORVASTATIN CALCIUM 10 MILLIGRAM(S): 80 TABLET, FILM COATED ORAL at 22:23

## 2017-05-18 RX ADMIN — HYDROMORPHONE HYDROCHLORIDE 1 MILLIGRAM(S): 2 INJECTION INTRAMUSCULAR; INTRAVENOUS; SUBCUTANEOUS at 22:23

## 2017-05-18 RX ADMIN — Medication 81 MILLIGRAM(S): at 13:18

## 2017-05-18 RX ADMIN — FLUOROMETHOLONE 1 DROP(S): 1 SOLUTION/ DROPS OPHTHALMIC at 17:46

## 2017-05-18 RX ADMIN — HYDROMORPHONE HYDROCHLORIDE 1 MILLIGRAM(S): 2 INJECTION INTRAMUSCULAR; INTRAVENOUS; SUBCUTANEOUS at 09:46

## 2017-05-18 RX ADMIN — HYDROMORPHONE HYDROCHLORIDE 1 MILLIGRAM(S): 2 INJECTION INTRAMUSCULAR; INTRAVENOUS; SUBCUTANEOUS at 06:40

## 2017-05-18 NOTE — PROGRESS NOTE ADULT - ASSESSMENT
would discharge patient and she can proceed with evaluation later should she and her family decide on long term access

## 2017-05-18 NOTE — PROGRESS NOTE ADULT - SUBJECTIVE AND OBJECTIVE BOX
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 The patient was recently evaluated by her vascular surgeon Dr. Awadallah who diagnosed her w/ B/L UE thrombi on 5/4/2017 (of note the patients' last HD session was on Saturday). The patient was evaluated yesterday at [facility]. Per Renal attempt at AVF salvage was unsuccessful. Today the patient was referred to the ED from [facility] as the patient was noted to be increasingly lethargic, febrile, and had left arm swelling and erythema. In the ED the patient was given Ceftriaxone 1g IV x 1, Zosyn 3.375g IV x 1, and Vancomycin 1g IV x 1. The patient was admitted to the CCU for emergent dialysis and shiley catheter placement. Renal, Vascular Surgery, and ID were consulted in the ED. (09 May 2017 18:07)    5/16: patient still c/o headache. No N/V, numbness or tingling. was seen by palliative yesterday and patient would like to continue HD.   5/17: s/p venogram. Lying in bed, weak and lethargic. Pt states she is tired.  Unable to provide good history.  Denies any specific complaints, no fever, chills, N, V, abd pain.  518: Pt chronically feels poor.  Always with generalized complaints. Appears weak overall as well.  Cannot provide good history.      REVIEW OF SYSTEMS: See HPI. All other review of systems is negative unless indicated above.     Vital Signs Last 24 Hrs  T(C): 36.8, Max: 37.3 (05-18 @ 13:17)  T(F): 98.3, Max: 99.1 (05-18 @ 13:17)  HR: 74 (69 - 84)  BP: 149/53 (89/46 - 154/53)  BP(mean): --  RR: 18 (18 - 18)  SpO2: 99% (95% - 100%)      Constitutional: NAD, frail, elderly, weak, lethargic  Neurological: AAO x 3, no focal deficits  HEENT: PERRLA, EOMI, MMM  Neck: Soft and supple, No LAD, No JVD  Respiratory: Breath sounds are clear bilaterally, No wheezing, rales or rhonchi  Cardiovascular: S1 and S2, regular rate and rhythm;, gallops or rubs; +Murmur  Gastrointestinal: Bowel Sounds present, soft, nontender, nondistended, no guarding, no rebound tenderness  Back: No CVA tenderness   Extremities: +LUE peripheral edema  Vascular: 2+ peripheral pulses  Musculoskeletal: 5/5 strength b/l upper and lower extremities  Skin: No rashes    MEDICATIONS  (STANDING):  aspirin enteric coated 81milliGRAM(s) Oral daily  isosorbide   mononitrate ER Tablet (IMDUR) 60milliGRAM(s) Oral daily  gabapentin 300milliGRAM(s) Oral at bedtime  sertraline 50milliGRAM(s) Oral daily  atorvastatin 10milliGRAM(s) Oral at bedtime  fluorometholone 0.1% Suspension 1Drop(s) Both EYES two times a day  heparin  Injectable 5000Unit(s) SubCutaneous every 8 hours  doxercalciferol Injectable 1MICROGram(s) IV Push <User Schedule>  carvedilol 12.5milliGRAM(s) Oral every 12 hours  epoetin hazel Injectable 8000Unit(s) IV Push <User Schedule>  Nephrocaps 1Capsule(s) Oral daily  amLODIPine   Tablet 5milliGRAM(s) Oral daily  lidocaine   Patch 1Patch Transdermal daily  insulin lispro (HumaLOG) corrective regimen sliding scale  SubCutaneous three times a day before meals  insulin lispro (HumaLOG) corrective regimen sliding scale  SubCutaneous at bedtime  dextrose 5%. 1000milliLiter(s) IV Continuous <Continuous>  dextrose 50% Injectable 12.5Gram(s) IV Push once  dextrose 50% Injectable 25Gram(s) IV Push once  dextrose 50% Injectable 25Gram(s) IV Push once  sevelamer hydrochloride 800milliGRAM(s) Oral three times a day with meals    MEDICATIONS  (PRN):  acetaminophen   Tablet 650milliGRAM(s) Oral every 6 hours PRN For Temp greater than 38 C (100.4 F)  acetaminophen   Tablet. 650milliGRAM(s) Oral every 6 hours PRN Mild Pain (1 - 3)  metoclopramide Injectable 10milliGRAM(s) IV Push once PRN Nausea and/or Vomiting  senna 2Tablet(s) Oral at bedtime PRN Constipation  docusate sodium 100milliGRAM(s) Oral three times a day PRN Constipation  HYDROmorphone   Tablet 1milliGRAM(s) Oral every 3 hours PRN pain or dyspnea  oxyCODONE IR 5milliGRAM(s) Oral every 6 hours PRN Moderate Pain (4 - 6)  dextrose Gel 1Dose(s) Oral once PRN Blood Glucose LESS THAN 70 milliGRAM(s)/deciliter  glucagon  Injectable 1milliGRAM(s) IntraMuscular once PRN Glucose LESS THAN 70 milligrams/deciliter                                10.8   11.0  )-----------( 192      ( 18 May 2017 08:40 )             35.0     05-18    140  |  103  |  72<H>  ----------------------------<  197<H>  4.9   |  24  |  7.00<H>    Ca    8.8      18 May 2017 08:40  Phos  6.9     05-18  Mg     2.4     05-18    TPro  x   /  Alb  2.9<L>  /  TBili  x   /  DBili  x   /  AST  x   /  ALT  x   /  AlkPhos  x   05-18    CAPILLARY BLOOD GLUCOSE  146 (18 May 2017 16:24)  196 (18 May 2017 13:11)  222 (18 May 2017 07:00)  137 (17 May 2017 22:41)    LIVER FUNCTIONS - ( 18 May 2017 08:40 )  Alb: 2.9 g/dL / Pro: x     / ALK PHOS: x     / ALT: x     / AST: x     / GGT: x                         Assessment and Plan:   		    *AMS/Metabolic encephalopathy 2ndry to  Left arm cellulitis + Infected Left arm AVF: Stable hemodynamically  -S/P Cefepime and Vanco  -Monitor Off ABX as per ID.  Now with leukocytosis/trending WBC count.  Will monitor  -Blood cx no growth  -repeat cultures  -Xray showing possible left side opacity.  Will evaluate for possible pneumonia.  Pt without cough or fever.      *?Clotted Left AVF + ESRD on HD  -appreciate Vascular Sx and renal consults  --Venous doppler of b/l upper ext: patent Left AVF   -s/p RIJ cath placement and ligation of Left AVF on 5/13 by Dr. Morin.  -s/p right arm venogram for evaluation for access for AV fistula for HD  -Palliative care consult appreciated - patient wants to proceed with Dialysis    *Occiptal Headache unsure of Etiology  -Neurologically intact  -CT head -->  Encephalomalacia and gliosis in the BILATERAL occipital and RIGHT parietal lobes, sequela of prior insult likely infarctions. There is moderate periventricular white matter ischemia.       Moderate mucosal thickening in the LEFT ethmoid and sphenoid sinus    *HTN: Acceptable  -cont amlodipine, coreg, pain meds    *DM 2:  -ISS  -monitor accuchecks    *CAD:  -cont asa, statins, coreg    *Code Status: DNR and DNI    dispo:  -eventual d/c to [facility]  *AMS/Metabolic encephalopathy 2ndry to  Left arm cellulitis + Infected Left arm AVF: Stable hemodynamically  -transferred from [facility]  -S/P Cefepime and Vanco  -Monitor Off ABX as per ID  -Blood cx no growth    *?Clotted Left AVF + ESRD on HD  -appreciate Vascular Sx and renal consults  -being dialyzed thru temp access in right groin  -Venous doppler of b/l upper ext: patent Left AVF  -s/p RIJ cath placement and ligation of Left AVF on 5/13 by Dr. Morin.  -Dr. Morin to do right arm venogram for evaluation for access for AV fistula for HD  -Palliative care consult appreciated - patient wants to prroceed with Dialysis    *Occiptal Headache unsure of Etiology  -Neurologically intact  -If symptoms worsen will consider neuro consult and CT head    *HTN: Better today;  may be sec to component of right sided neck pain  -cont amlodipine, coreg, pain meds    *DM 2:  -ISS  -monitor accuchecks    *CAD:  -cont asa, statins, coreg    *Code Status: DNR and DNI    *Disposition:    Attending Statement:  >35 minutes spent on total encounter; more than 50% of the visit was spent counseling and/or coordinating care by the attending physician.

## 2017-05-18 NOTE — PROGRESS NOTE ADULT - SUBJECTIVE AND OBJECTIVE BOX
NEPHROLOGY INTERVAL HPI/OVERNIGHT EVENTS:  5/18 SY  Pt fully alert and oriented.  Refused venogram yesterday.  Very hesitant to have avf in right arm for fear of losing fx.   While does not remember events of last week, pt's mental status has much improved.  Dr Peters's follow up appreciated.  Will re-evaluate AV access post d/c.    5/16 SY  Alert and oriented today.  Recognizes me without hesitancy.  Complains of not being given pudding etc.  Denies SOB.    5/11 SY  No acute event overnight.  Appears more comfortable.  Calm today and answering some questions appropriately.  Blood cultures negative so far.    5/10 SY  Awake but remains confused.  At baseline, pt is fully lucid and oriented.  No acute distress noted.    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  Pt has L UE avf, same side of AICD, with clotted UE bilaterally.. Pt recently seen by her surgeon Dr awadallah who dx her w bilat UE clots, she was also seen at [facility] yesterday, I spoke to Dr hearn.. He stated he could not get a through the clots adjacent to the PM wire to de clot the vein. he suggested ligating the avf may reduce the swelling..  Today arm is erythematous and pt is febrile, lethargic. IV abx  given in ER ( zosyn, rocephin, vanco)      MEDICATIONS  (STANDING):  aspirin enteric coated 81milliGRAM(s) Oral daily  isosorbide   mononitrate ER Tablet (IMDUR) 60milliGRAM(s) Oral daily  gabapentin 300milliGRAM(s) Oral at bedtime  sertraline 50milliGRAM(s) Oral daily  atorvastatin 10milliGRAM(s) Oral at bedtime  fluorometholone 0.1% Suspension 1Drop(s) Both EYES two times a day  heparin  Injectable 5000Unit(s) SubCutaneous every 8 hours  doxercalciferol Injectable 1MICROGram(s) IV Push <User Schedule>  carvedilol 12.5milliGRAM(s) Oral every 12 hours  epoetin hazel Injectable 8000Unit(s) IV Push <User Schedule>  Nephrocaps 1Capsule(s) Oral daily  amLODIPine   Tablet 5milliGRAM(s) Oral daily  lidocaine   Patch 1Patch Transdermal daily  insulin lispro (HumaLOG) corrective regimen sliding scale  SubCutaneous three times a day before meals  insulin lispro (HumaLOG) corrective regimen sliding scale  SubCutaneous at bedtime  dextrose 5%. 1000milliLiter(s) IV Continuous <Continuous>  dextrose 50% Injectable 12.5Gram(s) IV Push once  dextrose 50% Injectable 25Gram(s) IV Push once  dextrose 50% Injectable 25Gram(s) IV Push once    MEDICATIONS  (PRN):  acetaminophen   Tablet 650milliGRAM(s) Oral every 6 hours PRN For Temp greater than 38 C (100.4 F)  acetaminophen   Tablet. 650milliGRAM(s) Oral every 6 hours PRN Mild Pain (1 - 3)  metoclopramide Injectable 10milliGRAM(s) IV Push once PRN Nausea and/or Vomiting  senna 2Tablet(s) Oral at bedtime PRN Constipation  docusate sodium 100milliGRAM(s) Oral three times a day PRN Constipation  HYDROmorphone   Tablet 1milliGRAM(s) Oral every 3 hours PRN pain or dyspnea  oxyCODONE IR 5milliGRAM(s) Oral every 6 hours PRN Moderate Pain (4 - 6)  dextrose Gel 1Dose(s) Oral once PRN Blood Glucose LESS THAN 70 milliGRAM(s)/deciliter  glucagon  Injectable 1milliGRAM(s) IntraMuscular once PRN Glucose LESS THAN 70 milligrams/deciliter          Vital Signs Last 24 Hrs  T(C): 36.7, Max: 36.8 (05-17 @ 16:20)  T(F): 98, Max: 98.3 (05-17 @ 16:20)  HR: 71 (71 - 84)  BP: 126/57 (114/47 - 169/57)  BP(mean): --  RR: 18 (18 - 18)  SpO2: 100% (97% - 100%)  Daily     Daily       PHYSICAL EXAM:  Alert and appropriate.  Oriented today   GENERAL: No apparent distress  CHEST/LUNG: Clear to aus  HEART: S1S2 RRR   ABDOMEN: soft  EXTREMITIES: LE trace edema  SKIN:     LABS:                        10.8   11.0  )-----------( 192      ( 18 May 2017 08:40 )             35.0     05-18    140  |  103  |  72<H>  ----------------------------<  197<H>  4.9   |  24  |  7.00<H>    Ca    8.8      18 May 2017 08:40  Phos  6.9     05-18  Mg     2.4     05-18    TPro  x   /  Alb  2.9<L>  /  TBili  x   /  DBili  x   /  AST  x   /  ALT  x   /  AlkPhos  x   05-18        Phosphorus Level, Serum: 6.9 mg/dL (05-18 @ 08:40)  Magnesium, Serum: 2.4 mg/dL (05-18 @ 08:40)          RADIOLOGY & ADDITIONAL TESTS:

## 2017-05-18 NOTE — PROGRESS NOTE ADULT - SUBJECTIVE AND OBJECTIVE BOX
Pt refused venogram yesterday      MEDICATIONS  (STANDING):  aspirin enteric coated 81milliGRAM(s) Oral daily  isosorbide   mononitrate ER Tablet (IMDUR) 60milliGRAM(s) Oral daily  gabapentin 300milliGRAM(s) Oral at bedtime  sertraline 50milliGRAM(s) Oral daily  atorvastatin 10milliGRAM(s) Oral at bedtime  fluorometholone 0.1% Suspension 1Drop(s) Both EYES two times a day  heparin  Injectable 5000Unit(s) SubCutaneous every 8 hours  doxercalciferol Injectable 1MICROGram(s) IV Push <User Schedule>  carvedilol 12.5milliGRAM(s) Oral every 12 hours  epoetin hazel Injectable 8000Unit(s) IV Push <User Schedule>  Nephrocaps 1Capsule(s) Oral daily  amLODIPine   Tablet 5milliGRAM(s) Oral daily  lidocaine   Patch 1Patch Transdermal daily  insulin lispro (HumaLOG) corrective regimen sliding scale  SubCutaneous three times a day before meals  insulin lispro (HumaLOG) corrective regimen sliding scale  SubCutaneous at bedtime  dextrose 5%. 1000milliLiter(s) IV Continuous <Continuous>  dextrose 50% Injectable 12.5Gram(s) IV Push once  dextrose 50% Injectable 25Gram(s) IV Push once  dextrose 50% Injectable 25Gram(s) IV Push once    MEDICATIONS  (PRN):  acetaminophen   Tablet 650milliGRAM(s) Oral every 6 hours PRN For Temp greater than 38 C (100.4 F)  acetaminophen   Tablet. 650milliGRAM(s) Oral every 6 hours PRN Mild Pain (1 - 3)  metoclopramide Injectable 10milliGRAM(s) IV Push once PRN Nausea and/or Vomiting  senna 2Tablet(s) Oral at bedtime PRN Constipation  docusate sodium 100milliGRAM(s) Oral three times a day PRN Constipation  HYDROmorphone   Tablet 1milliGRAM(s) Oral every 3 hours PRN pain or dyspnea  oxyCODONE IR 5milliGRAM(s) Oral every 6 hours PRN Moderate Pain (4 - 6)  dextrose Gel 1Dose(s) Oral once PRN Blood Glucose LESS THAN 70 milliGRAM(s)/deciliter  glucagon  Injectable 1milliGRAM(s) IntraMuscular once PRN Glucose LESS THAN 70 milligrams/deciliter      Allergies    No Known Allergies    Intolerances        Flatus: [ ] YES [ ] NO             Bowel Movement: [ ] YES [ ] NO  Pain (0-10):            Pain Control Adequate: [ ] YES [ ] NO  Nausea: [ ] YES [ ] NO            Vomiting: [ ] YES [ ] NO  Diarrhea: [ ] YES [ ] NO         Constipation: [ ] YES [ ] NO     Chest Pain: [ ] YES [ ] NO    SOB:  [ ] YES [ ] NO    Vital Signs Last 24 Hrs  T(C): 36.7, Max: 36.8 (05-17 @ 16:20)  T(F): 98, Max: 98.3 (05-17 @ 16:20)  HR: 75 (72 - 77)  BP: 127/54 (114/47 - 169/57)  BP(mean): --  RR: 18 (18 - 18)  SpO2: 100% (97% - 100%)    I&O's Summary      Physical Exam:  General: NAD, resting comfortably  Pulmonary: normal resp effort, CTA-B  Cardiovascular: NSR  Abdominal: soft, NT/ND  Extremities: WWP, normal strength  Neuro: A/O x 3, CNs II-XII grossly intact, normal motor/sensation, no focal deficits  Pulses:   Right:                                                                          Left:  FEM [ ]2+ [ ]1+ [ ]doppler                                             FEM [ ]2+ [ ]1+ [ ]doppler    POP [ ]2+ [ ]1+ [ ]doppler                                             POP [ ]2+ [ ]1+ [ ]doppler    DP [ ]2+ [ ]1+ [ ]doppler                                                DP [ ]2+ [ ]1+ [ ]doppler  PT[ ]2+ [ ]1+ [ ]doppler                                                  PT [ ]2+ [ ]1+ [ ]doppler    LABS:                        10.6   9.8   )-----------( 202      ( 17 May 2017 07:44 )             34.4     05-17    139  |  101  |  51<H>  ----------------------------<  332<H>  4.2   |  26  |  5.70<H>    Ca    8.5      17 May 2017 07:44  Phos  5.5     05-17    TPro  x   /  Alb  2.9<L>  /  TBili  x   /  DBili  x   /  AST  x   /  ALT  x   /  AlkPhos  x   05-17        LIVER FUNCTIONS - ( 17 May 2017 07:44 )  Alb: 2.9 g/dL / Pro: x     / ALK PHOS: x     / ALT: x     / AST: x     / GGT: x           CAPILLARY BLOOD GLUCOSE  222 (18 May 2017 07:00)  137 (17 May 2017 22:41)  262 (17 May 2017 17:08)      RADIOLOGY & ADDITIONAL TESTS:

## 2017-05-18 NOTE — PROGRESS NOTE ADULT - ASSESSMENT
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  Pt has L UE avf, same side of AICD, with clotted UE bilaterally.. Pt recently seen by her surgeon Dr awadallah who dx her w bilat UE clots, she was also seen at [facility] yesterday, I spoke to Dr hearn.. He stated he could not get a through the clots adjacent to the PM wire to de clot the vein. he suggested ligating the avf may reduce the swelling..  Today arm is erythematous and pt is febrile, lethargic. IV abx  given in ER ( zosyn, rocephin, vanco)    D/W pts daughter, will place femoral line and dialyze today  ID consult, Dr Carrasco informed, suggested to place on Cefepime 1 g iv daily starting in am, after hd on hd days..  Dr De La O consulted for second opinion of av access..  lactate level w HD    5/13  comfortable eating   no complaints  for hd monday  R IJ cvc in place  5/14  pt earlier commented that she wants to stop hd but then stated she is considering it.  Daughter and  in the room  due for hd through R ij, which she states is uncomfortable  discussed with palliative care  will d/w dr de la o re site of perm access     5/15 MK  - ESRD: tolerating hd with goal uf of 2 kg on 2 k bath.  - s/p AVF ligation with perm cath, pt eager to have it removed.  agreeable to have a new access placed- palliative input noted  - AMS much improved    5/16 SY  --ESRD  Appreciate Palliative medicine follow up.  Pt wishes to continue HD  --S/p left arm AVF ligation.  Edema much improved.  --D/w Dr Peters.  To study Right central vein patency to determine whether AVF in right arm is a possibility.  --Mental status improved and stable    5/17 MK  - ESRD: for hd after venogram today.  electrolytes and respiratory status stable  - S/p avf ligation    5/18 SY  --ESRD  Tolerating tx well.   HD order and tx reviewed. Permcath fx well.  --AV Access : pt fully lucid and for now refusing AVF in right arm.  Pt is aware of increased risk of infection with permcath.  However due to Left arm dysfx due to AVF malfunction, pt is realistically hesitant to put right arm at possible risk.  --Leukocytosis.  WBC has been increasing.  Unclear source.   Recheck urine culture and UA.  --Hyperphosphatemia : Start phos binder.

## 2017-05-19 VITALS
DIASTOLIC BLOOD PRESSURE: 60 MMHG | SYSTOLIC BLOOD PRESSURE: 145 MMHG | OXYGEN SATURATION: 97 % | HEART RATE: 68 BPM | RESPIRATION RATE: 17 BRPM | TEMPERATURE: 99 F

## 2017-05-19 LAB
ANION GAP SERPL CALC-SCNC: 12 MMOL/L — SIGNIFICANT CHANGE UP (ref 5–17)
BUN SERPL-MCNC: 46 MG/DL — HIGH (ref 7–23)
CALCIUM SERPL-MCNC: 8.6 MG/DL — SIGNIFICANT CHANGE UP (ref 8.5–10.1)
CHLORIDE SERPL-SCNC: 100 MMOL/L — SIGNIFICANT CHANGE UP (ref 96–108)
CO2 SERPL-SCNC: 25 MMOL/L — SIGNIFICANT CHANGE UP (ref 22–31)
CREAT SERPL-MCNC: 4.47 MG/DL — HIGH (ref 0.5–1.3)
GLUCOSE SERPL-MCNC: 149 MG/DL — HIGH (ref 70–99)
HCT VFR BLD CALC: 33.2 % — LOW (ref 34.5–45)
HGB BLD-MCNC: 10.2 G/DL — LOW (ref 11.5–15.5)
MCHC RBC-ENTMCNC: 30.4 PG — SIGNIFICANT CHANGE UP (ref 27–34)
MCHC RBC-ENTMCNC: 30.7 GM/DL — LOW (ref 32–36)
MCV RBC AUTO: 99.1 FL — SIGNIFICANT CHANGE UP (ref 80–100)
PLATELET # BLD AUTO: 164 K/UL — SIGNIFICANT CHANGE UP (ref 150–400)
POTASSIUM SERPL-MCNC: 4.5 MMOL/L — SIGNIFICANT CHANGE UP (ref 3.5–5.3)
POTASSIUM SERPL-SCNC: 4.5 MMOL/L — SIGNIFICANT CHANGE UP (ref 3.5–5.3)
RBC # BLD: 3.35 M/UL — LOW (ref 3.8–5.2)
RBC # FLD: 15.5 % — HIGH (ref 10.3–14.5)
SODIUM SERPL-SCNC: 137 MMOL/L — SIGNIFICANT CHANGE UP (ref 135–145)
WBC # BLD: 9 K/UL — SIGNIFICANT CHANGE UP (ref 3.8–10.5)
WBC # FLD AUTO: 9 K/UL — SIGNIFICANT CHANGE UP (ref 3.8–10.5)

## 2017-05-19 RX ORDER — INSULIN GLARGINE 100 [IU]/ML
4 INJECTION, SOLUTION SUBCUTANEOUS
Qty: 0 | Refills: 0 | COMMUNITY

## 2017-05-19 RX ORDER — ISOSORBIDE MONONITRATE 60 MG/1
1 TABLET, EXTENDED RELEASE ORAL
Qty: 0 | Refills: 0 | COMMUNITY
Start: 2017-05-19

## 2017-05-19 RX ORDER — CARVEDILOL PHOSPHATE 80 MG/1
1 CAPSULE, EXTENDED RELEASE ORAL
Qty: 0 | Refills: 0 | COMMUNITY

## 2017-05-19 RX ORDER — SERTRALINE 25 MG/1
1 TABLET, FILM COATED ORAL
Qty: 0 | Refills: 0 | COMMUNITY
Start: 2017-05-19

## 2017-05-19 RX ORDER — SERTRALINE 25 MG/1
1 TABLET, FILM COATED ORAL
Qty: 0 | Refills: 0 | COMMUNITY

## 2017-05-19 RX ORDER — ISOSORBIDE MONONITRATE 60 MG/1
1 TABLET, EXTENDED RELEASE ORAL
Qty: 0 | Refills: 0 | COMMUNITY

## 2017-05-19 RX ORDER — GABAPENTIN 400 MG/1
1 CAPSULE ORAL
Qty: 0 | Refills: 0 | COMMUNITY

## 2017-05-19 RX ORDER — ATORVASTATIN CALCIUM 80 MG/1
1 TABLET, FILM COATED ORAL
Qty: 0 | Refills: 0 | COMMUNITY

## 2017-05-19 RX ORDER — ATORVASTATIN CALCIUM 80 MG/1
1 TABLET, FILM COATED ORAL
Qty: 0 | Refills: 0 | COMMUNITY
Start: 2017-05-19

## 2017-05-19 RX ORDER — INSULIN ASPART 100 [IU]/ML
2 INJECTION, SOLUTION SUBCUTANEOUS
Qty: 0 | Refills: 0 | COMMUNITY

## 2017-05-19 RX ORDER — FLUOROMETHOLONE 1 MG/ML
1 SOLUTION/ DROPS OPHTHALMIC
Qty: 0 | Refills: 0 | COMMUNITY
Start: 2017-05-19

## 2017-05-19 RX ORDER — GABAPENTIN 400 MG/1
1 CAPSULE ORAL
Qty: 0 | Refills: 0 | COMMUNITY
Start: 2017-05-19

## 2017-05-19 RX ORDER — AMLODIPINE BESYLATE 2.5 MG/1
1 TABLET ORAL
Qty: 0 | Refills: 0 | COMMUNITY
Start: 2017-05-19

## 2017-05-19 RX ORDER — AMLODIPINE BESYLATE 2.5 MG/1
1 TABLET ORAL
Qty: 0 | Refills: 0 | COMMUNITY

## 2017-05-19 RX ORDER — ASPIRIN/CALCIUM CARB/MAGNESIUM 324 MG
1 TABLET ORAL
Qty: 0 | Refills: 0 | COMMUNITY

## 2017-05-19 RX ORDER — FLUOROMETHOLONE 1 MG/ML
1 SOLUTION/ DROPS OPHTHALMIC
Qty: 0 | Refills: 0 | COMMUNITY

## 2017-05-19 RX ORDER — SEVELAMER CARBONATE 2400 MG/1
1 POWDER, FOR SUSPENSION ORAL
Qty: 0 | Refills: 0 | COMMUNITY

## 2017-05-19 RX ORDER — ASPIRIN/CALCIUM CARB/MAGNESIUM 324 MG
1 TABLET ORAL
Qty: 0 | Refills: 0 | COMMUNITY
Start: 2017-05-19

## 2017-05-19 RX ORDER — CARVEDILOL PHOSPHATE 80 MG/1
1 CAPSULE, EXTENDED RELEASE ORAL
Qty: 0 | Refills: 0 | COMMUNITY
Start: 2017-05-19

## 2017-05-19 RX ORDER — SEVELAMER CARBONATE 2400 MG/1
1 POWDER, FOR SUSPENSION ORAL
Qty: 0 | Refills: 0 | COMMUNITY
Start: 2017-05-19

## 2017-05-19 RX ORDER — OXYCODONE HYDROCHLORIDE 5 MG/1
1 TABLET ORAL
Qty: 0 | Refills: 0 | COMMUNITY
Start: 2017-05-19

## 2017-05-19 RX ADMIN — ISOSORBIDE MONONITRATE 60 MILLIGRAM(S): 60 TABLET, EXTENDED RELEASE ORAL at 12:40

## 2017-05-19 RX ADMIN — FLUOROMETHOLONE 1 DROP(S): 1 SOLUTION/ DROPS OPHTHALMIC at 06:27

## 2017-05-19 RX ADMIN — OXYCODONE HYDROCHLORIDE 5 MILLIGRAM(S): 5 TABLET ORAL at 12:42

## 2017-05-19 RX ADMIN — LIDOCAINE 1 PATCH: 4 CREAM TOPICAL at 05:46

## 2017-05-19 RX ADMIN — Medication 81 MILLIGRAM(S): at 12:40

## 2017-05-19 RX ADMIN — SEVELAMER CARBONATE 800 MILLIGRAM(S): 2400 POWDER, FOR SUSPENSION ORAL at 12:40

## 2017-05-19 RX ADMIN — HEPARIN SODIUM 5000 UNIT(S): 5000 INJECTION INTRAVENOUS; SUBCUTANEOUS at 06:27

## 2017-05-19 RX ADMIN — SERTRALINE 50 MILLIGRAM(S): 25 TABLET, FILM COATED ORAL at 12:41

## 2017-05-19 RX ADMIN — LIDOCAINE 1 PATCH: 4 CREAM TOPICAL at 12:41

## 2017-05-19 RX ADMIN — Medication 1 CAPSULE(S): at 12:43

## 2017-05-19 RX ADMIN — CARVEDILOL PHOSPHATE 12.5 MILLIGRAM(S): 80 CAPSULE, EXTENDED RELEASE ORAL at 06:27

## 2017-05-19 RX ADMIN — AMLODIPINE BESYLATE 5 MILLIGRAM(S): 2.5 TABLET ORAL at 06:26

## 2017-05-19 NOTE — DISCHARGE NOTE ADULT - PATIENT PORTAL LINK FT
“You can access the [facility] Patient Portal, offered by [facility], by registering with the following website: [email or web address]”

## 2017-05-19 NOTE — DISCHARGE NOTE ADULT - SECONDARY DIAGNOSIS.
DM2 (diabetes mellitus, type 2) ESRD on hemodialysis Coronary artery disease without angina pectoris, unspecified vessel or lesion type, unspecified whether native or transplanted heart

## 2017-05-19 NOTE — DISCHARGE NOTE ADULT - HOSPITAL COURSE
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 The patient was recently evaluated by her vascular surgeon Dr. Awadallah who diagnosed her w/ B/L UE thrombi on 5/4/2017 (of note the patients' last HD session was on Saturday). The patient was evaluated yesterday at [facility]. Per Renal attempt at AVF salvage was unsuccessful. Today the patient was referred to the ED from [facility] as the patient was noted to be increasingly lethargic, febrile, and had left arm swelling and erythema. In the ED the patient was given Ceftriaxone 1g IV x 1, Zosyn 3.375g IV x 1, and Vancomycin 1g IV x 1. The patient was admitted to the CCU for emergent dialysis and shiley catheter placement. Renal, Vascular Surgery, and ID were consulted in the ED. (09 May 2017 18:07)    5/16: patient still c/o headache. No N/V, numbness or tingling. was seen by palliative yesterday and patient would like to continue HD.   5/17: s/p venogram. Lying in bed, weak and lethargic. Pt states she is tired.  Unable to provide good history.  Denies any specific complaints, no fever, chills, N, V, abd pain.  518: Pt chronically feels poor.  Always with generalized complaints. Appears weak overall as well.  Cannot provide good history.  5/19: Pt always states she feels lousy.  Has a chronic headache off and on. No fever, chills, N, V, abd pain. + headache.  She finished a course of antibiotics per ID and now off abx.  She has remained afebrile, with normal WBC count.  Pt refused venogram for new permanent access and so she will have to pursue vascular intervention as an outpatient.  She is s/p left arm AVF ligation with edema much improved. She is to resume HD per nephro via permcath that was placed.  Pt again hesitant about new fistula given previous complications.   She will be discharged to [facility] today with outpatient follow up.  She is medically stable for discharge.    REVIEW OF SYSTEMS: See HPI. All other review of systems is negative unless indicated above.     Vital Signs Last 24 Hrs  T(C): 37.4, Max: 37.4 (05-19 @ 11:14)  T(F): 99.4, Max: 99.4 (05-19 @ 11:14)  HR: 68 (68 - 79)  BP: 145/60 (90/43 - 161/56)  RR: 17 (17 - 18)  SpO2: 97% (95% - 100%)      Constitutional: NAD, frail, elderly, weak,   Neurological: AAO x 3, no focal deficits  HEENT: PERRLA, EOMI, MMM  Neck: Soft and supple, No LAD, No JVD  Respiratory: Breath sounds are clear bilaterally, No wheezing, rales or rhonchi  Cardiovascular: S1 and S2, regular rate and rhythm;, gallops or rubs; +Murmur  Gastrointestinal: Bowel Sounds present, soft, nontender, nondistended, no guarding, no rebound tenderness  Back: No CVA tenderness   Extremities: +LUE peripheral edema  Vascular: 2+ peripheral pulses  Musculoskeletal: 5/5 strength b/l upper and lower extremities    MEDICATIONS: Reviewed  labs: Reviewed       Assessment and Plan:   		    *AMS/Metabolic encephalopathy 2ndry to  Left arm cellulitis + Infected Left arm AVF: Stable hemodynamically, and AMS resolved.  -S/P course of cefepime and Vanco  -leukocytosis normalized.  -Blood cx no growth  -No new changes seen on xray.      *Clotted Left AVF + ESRD on HD  --Venous doppler of b/l upper ext: patent Left AVF   -s/p RIJ cath placement and ligation of Left AVF on 5/13 by Dr. Morin of Vascular surgery.  -s/p right arm venogram for evaluation for access for AV fistula for HD  -Palliative care consult appreciated - patient wants to proceed with Dialysis    *Occiptal Headache unsure of Etiology, likely chronic headache.  -Neurologically intact. Supportive care with pain meds.  -CT head -->  Encephalomalacia and gliosis in the BILATERAL occipital and RIGHT parietal lobes, sequela of prior insult likely infarctions. There is moderate periventricular white matter ischemia.       Moderate mucosal thickening in the LEFT ethmoid and sphenoid sinus    *HTN: Acceptable - cont amlodipine, coreg, pain meds    *DM 2: Continue insulin.    *CAD: cont asa, statins, coreg    *Code Status: DNR and DNI  *AMS/Metabolic encephalopathy 2ndry to  Left arm cellulitis + Infected Left arm AVF: Stable hemodynamically  -transferred from [facility]  -S/P Cefepime and Vanco  -Monitor Off ABX as per ID  -Blood cx no growth    *?Clotted Left AVF + ESRD on HD  -appreciate Vascular Sx and renal consults  -being dialyzed thru temp access in right groin  -Venous doppler of b/l upper ext: patent Left AVF  -s/p RIJ cath placement and ligation of Left AVF on 5/13 by Dr. Morin.  -Dr. Morin to do right arm venogram for evaluation for access for AV fistula for HD  -Palliative care consult appreciated - patient wants to prroceed with Dialysis    *Occiptal Headache unsure of Etiology  -Neurologically intact  -If symptoms worsen will consider neuro consult and CT head    *HTN: Better today;  may be sec to component of right sided neck pain  -cont amlodipine, coreg, pain meds    *DM 2:  -ISS  -monitor accuchecks    *CAD:  -cont asa, statins, coreg    *Code Status: DNR and DNI    *Disposition:    Attending Statement:  >35 minutes spent on total encounter/discharge; more than 50% of the visit was spent counseling and/or coordinating care by the attending physician.    )

## 2017-05-19 NOTE — PROGRESS NOTE ADULT - ASSESSMENT
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  Pt has L UE avf, same side of AICD, with clotted UE bilaterally.. Pt recently seen by her surgeon Dr awadallah who dx her w bilat UE clots, she was also seen at [facility] yesterday, I spoke to Dr hearn.. He stated he could not get a through the clots adjacent to the PM wire to de clot the vein. he suggested ligating the avf may reduce the swelling..  Today arm is erythematous and pt is febrile, lethargic. IV abx  given in ER ( zosyn, rocephin, vanco)    D/W pts daughter, will place femoral line and dialyze today  ID consult, Dr Carrasco informed, suggested to place on Cefepime 1 g iv daily starting in am, after hd on hd days..  Dr De La O consulted for second opinion of av access..  lactate level w HD    5/13  comfortable eating   no complaints  for hd monday  R IJ cvc in place  5/14  pt earlier commented that she wants to stop hd but then stated she is considering it.  Daughter and  in the room  due for hd through R ij, which she states is uncomfortable  discussed with palliative care  will d/w dr de la o re site of perm access     5/15 MK  - ESRD: tolerating hd with goal uf of 2 kg on 2 k bath.  - s/p AVF ligation with perm cath, pt eager to have it removed.  agreeable to have a new access placed- palliative input noted  - AMS much improved    5/16 SY  --ESRD  Appreciate Palliative medicine follow up.  Pt wishes to continue HD  --S/p left arm AVF ligation.  Edema much improved.  --D/w Dr Peters.  To study Right central vein patency to determine whether AVF in right arm is a possibility.  --Mental status improved and stable    5/17 MK  - ESRD: for hd after venogram today.  electrolytes and respiratory status stable  - S/p avf ligation    5/18 SY  --ESRD  Tolerating tx well.   HD order and tx reviewed. Permcath fx well.  --AV Access : pt fully lucid and for now refusing AVF in right arm.  Pt is aware of increased risk of infection with permcath.  However due to Left arm dysfx due to AVF malfunction, pt is realistically hesitant to put right arm at possible risk.  --Leukocytosis.  WBC has been increasing.  Unclear source.   Recheck urine culture and UA.  --Hyperphosphatemia : Start phos binder.    5/19 MK  - ESRD; next hd in am  - improved leukocytosis  - s/p avf ligation, pt is reluctant to place avf in rt hand.    can be dc'd from renal standpoint

## 2017-05-19 NOTE — DISCHARGE NOTE ADULT - CARE PLAN
Principal Discharge DX:	Sepsis, due to unspecified organism  Goal:	you finished a course of antibiotics for a skin infection (cellulitis)  Instructions for follow-up, activity and diet:	monitor for signs of infection.  Secondary Diagnosis:	DM2 (diabetes mellitus, type 2)  Goal:	continue insulin  Secondary Diagnosis:	ESRD on hemodialysis  Goal:	continue dialysis.  Secondary Diagnosis:	Coronary artery disease without angina pectoris, unspecified vessel or lesion type, unspecified whether native or transplanted heart  Goal:	continue home meds

## 2017-05-19 NOTE — DISCHARGE NOTE ADULT - PLAN OF CARE
you finished a course of antibiotics for a skin infection (cellulitis) continue insulin continue dialysis. continue home meds monitor for signs of infection.

## 2017-05-19 NOTE — CHART NOTE - NSCHARTNOTEFT_GEN_A_CORE
Upon Nutritional Assessment by the Registered Dietitian your patient was determined to meet criteria has evidence of the following diagnosis/diagnoses:        [ ]  Mild Protein Calorie Malnutrition        [X]  Moderate Protein Calorie Malnutrition        [ ] Severe Protein Calorie Malnutrition        [ ] Unspecified Protein Calorie Malnutrition    Findings as based on:  •  Comprehensive nutrition assessment and Nutrition focused physical examination  •  Insufficient food/energy intake  •  Weight loss over time(Please specify time period)  •  Loss of muscle mass  •  Loss of fat mass  •  Fluid accumulation    Findings relevant to patient:  1. 50% PO intake > 5 days  2. 6# weight loss (5%) over the past 2-4 weeks  3 hx of ESRD on HD and moderate temporal muscle wasting    Nutrition Interventions:  1. Consider appetite stimulant  2. Continue liberalized diet  3. Will continue to send Nepro BID and prosource daily.  4. Add nephrovite daily    TO BE COMPLETED BY PROVIDER:          [ ] Agree:         [ ] Disagree:    Comments: Upon Nutritional Assessment by the Registered Dietitian your patient was determined to meet criteria has evidence of the following diagnosis/diagnoses:        [ ]  Mild Protein Calorie Malnutrition        [X]  Moderate Protein Calorie Malnutrition        [ ] Severe Protein Calorie Malnutrition        [ ] Unspecified Protein Calorie Malnutrition    Findings as based on:  •  Comprehensive nutrition assessment and Nutrition focused physical examination  •  Insufficient food/energy intake  •  Weight loss over time(Please specify time period)  •  Loss of muscle mass  •  Loss of fat mass  •  Fluid accumulation    Findings relevant to patient:  1. 50% PO intake > 5 days  2. 6# weight loss (5%) over the past 2-4 weeks  3 hx of ESRD on HD and moderate temporal muscle wasting    Nutrition Interventions:  1. Consider appetite stimulant  2. Consider liberalizing diet to regular (Renal MD in agreement)  3. Will continue to send Nepro BID and prosource daily.  4. Add nephrovite daily    TO BE COMPLETED BY PROVIDER:          [ ] Agree:         [ ] Disagree:    Comments:

## 2017-05-19 NOTE — PROGRESS NOTE ADULT - SUBJECTIVE AND OBJECTIVE BOX
NEPHROLOGY INTERVAL HPI/OVERNIGHT EVENTS:  still with not sure what to do with her AVF in right arm. is worried since she writes with it.  c/o of occipital pain after they put that "wax on me".  unclear if referring to surgery time.  able to lift her neck for a pillow.  no lesion noted.  prefers oxycodone     MEDICATIONS  (STANDING):  aspirin enteric coated 81milliGRAM(s) Oral daily  isosorbide   mononitrate ER Tablet (IMDUR) 60milliGRAM(s) Oral daily  gabapentin 300milliGRAM(s) Oral at bedtime  sertraline 50milliGRAM(s) Oral daily  atorvastatin 10milliGRAM(s) Oral at bedtime  fluorometholone 0.1% Suspension 1Drop(s) Both EYES two times a day  heparin  Injectable 5000Unit(s) SubCutaneous every 8 hours  doxercalciferol Injectable 1MICROGram(s) IV Push <User Schedule>  carvedilol 12.5milliGRAM(s) Oral every 12 hours  epoetin hazel Injectable 8000Unit(s) IV Push <User Schedule>  Nephrocaps 1Capsule(s) Oral daily  amLODIPine   Tablet 5milliGRAM(s) Oral daily  lidocaine   Patch 1Patch Transdermal daily  insulin lispro (HumaLOG) corrective regimen sliding scale  SubCutaneous three times a day before meals  insulin lispro (HumaLOG) corrective regimen sliding scale  SubCutaneous at bedtime  dextrose 5%. 1000milliLiter(s) IV Continuous <Continuous>  dextrose 50% Injectable 12.5Gram(s) IV Push once  dextrose 50% Injectable 25Gram(s) IV Push once  dextrose 50% Injectable 25Gram(s) IV Push once  sevelamer hydrochloride 800milliGRAM(s) Oral three times a day with meals    MEDICATIONS  (PRN):  acetaminophen   Tablet 650milliGRAM(s) Oral every 6 hours PRN For Temp greater than 38 C (100.4 F)  acetaminophen   Tablet. 650milliGRAM(s) Oral every 6 hours PRN Mild Pain (1 - 3)  metoclopramide Injectable 10milliGRAM(s) IV Push once PRN Nausea and/or Vomiting  senna 2Tablet(s) Oral at bedtime PRN Constipation  docusate sodium 100milliGRAM(s) Oral three times a day PRN Constipation  HYDROmorphone   Tablet 1milliGRAM(s) Oral every 3 hours PRN pain or dyspnea  oxyCODONE IR 5milliGRAM(s) Oral every 6 hours PRN Moderate Pain (4 - 6)  dextrose Gel 1Dose(s) Oral once PRN Blood Glucose LESS THAN 70 milliGRAM(s)/deciliter  glucagon  Injectable 1milliGRAM(s) IntraMuscular once PRN Glucose LESS THAN 70 milligrams/deciliter      Allergies    No Known Allergies    Intolerances        I&O's Detail      Vital Signs Last 24 Hrs  T(C): 37.4, Max: 37.4 (05-19 @ 11:14)  T(F): 99.4, Max: 99.4 (05-19 @ 11:14)  HR: 68 (68 - 79)  BP: 145/60 (89/46 - 161/56)  BP(mean): --  RR: 17 (17 - 18)  SpO2: 97% (95% - 100%)  Daily     Daily     PHYSICAL EXAM:  General: alert. awake Ox3  HEENT: MMM  CV: s1s2 rrr  LUNGS: B/L CTA  EXT: no edema    LABS:                        10.2   9.0   )-----------( 164      ( 19 May 2017 07:00 )             33.2     05-19    137  |  100  |  46<H>  ----------------------------<  149<H>  4.5   |  25  |  4.47<H>    Ca    8.6      19 May 2017 07:00  Phos  6.9     05-18  Mg     2.4     05-18    TPro  x   /  Alb  2.9<L>  /  TBili  x   /  DBili  x   /  AST  x   /  ALT  x   /  AlkPhos  x   05-18

## 2017-05-19 NOTE — DISCHARGE NOTE ADULT - MEDICATION SUMMARY - MEDICATIONS TO TAKE
I will START or STAY ON the medications listed below when I get home from the hospital:    Tylenol 325 mg oral tablet  -- 2 tab(s) by mouth every 4 hours, As Needed  -- Indication: For Pain    oxyCODONE 5 mg oral tablet  -- 1 tab(s) by mouth every 6 hours, As needed, Moderate Pain (4 - 6)  -- Indication: For Pain    aspirin 81 mg oral delayed release tablet  -- 1 tab(s) by mouth once a day  -- Indication: For Prophylactic measure    isosorbide mononitrate 60 mg oral tablet, extended release  -- 1 tab(s) by mouth once a day  -- Indication: For CAD (coronary artery disease)    gabapentin 300 mg oral capsule  -- 1 cap(s) by mouth once a day (at bedtime)  -- Indication: For Pain    sertraline 50 mg oral tablet  -- 1 tab(s) by mouth once a day  -- Indication: For Depression    metoclopramide 5 mg oral tablet  -- 1 tab(s) by mouth 3 times a day (before meals)  -- Indication: For Aniemetic    atorvastatin 10 mg oral tablet  -- 1 tab(s) by mouth once a day (at bedtime)  -- Indication: For CAD (coronary artery disease)    carvedilol 12.5 mg oral tablet  -- 1 tab(s) by mouth every 12 hours  -- Indication: For CAD (coronary artery disease)    amLODIPine 5 mg oral tablet  -- 1 tab(s) by mouth once a day  -- Indication: For Hypertension    Senna 8.6 mg oral tablet  -- 1 tab(s) by mouth once a day (at bedtime)  -- Indication: For Constipation    lactulose 10 g/15 mL oral syrup  -- 30 milliliter(s) by mouth once a day, As Needed  -- Indication: For Constipation    fluorometholone 0.1% ophthalmic suspension  -- 1 drop(s) to each affected eye 2 times a day  -- Indication: For Eyes    sevelamer hydrochloride 800 mg oral tablet  -- 1 tab(s) by mouth 3 times a day (with meals)  -- Indication: For Phosphate binder    Nephro-Heather oral tablet  -- 1 tab(s) by mouth once a day  -- Indication: For Prophylactic measure    Vitamin D3 1000 intl units oral capsule  -- 1 cap(s) by mouth once a day  -- Indication: For Prophylactic measure

## 2017-05-23 DIAGNOSIS — Z89.511 ACQUIRED ABSENCE OF RIGHT LEG BELOW KNEE: ICD-10-CM

## 2017-05-23 DIAGNOSIS — Z86.718 PERSONAL HISTORY OF OTHER VENOUS THROMBOSIS AND EMBOLISM: ICD-10-CM

## 2017-05-23 DIAGNOSIS — N18.6 END STAGE RENAL DISEASE: ICD-10-CM

## 2017-05-23 DIAGNOSIS — I12.0 HYPERTENSIVE CHRONIC KIDNEY DISEASE WITH STAGE 5 CHRONIC KIDNEY DISEASE OR END STAGE RENAL DISEASE: ICD-10-CM

## 2017-05-23 DIAGNOSIS — D72.829 ELEVATED WHITE BLOOD CELL COUNT, UNSPECIFIED: ICD-10-CM

## 2017-05-23 DIAGNOSIS — Z95.1 PRESENCE OF AORTOCORONARY BYPASS GRAFT: ICD-10-CM

## 2017-05-23 DIAGNOSIS — Z95.810 PRESENCE OF AUTOMATIC (IMPLANTABLE) CARDIAC DEFIBRILLATOR: ICD-10-CM

## 2017-05-23 DIAGNOSIS — A41.9 SEPSIS, UNSPECIFIED ORGANISM: ICD-10-CM
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
## 2017-05-23 DIAGNOSIS — G93.41 METABOLIC ENCEPHALOPATHY: ICD-10-CM
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
## 2017-05-23 DIAGNOSIS — Z66 DO NOT RESUSCITATE: ICD-10-CM

## 2017-05-23 DIAGNOSIS — I25.2 OLD MYOCARDIAL INFARCTION: ICD-10-CM

## 2017-05-23 DIAGNOSIS — I25.10 ATHEROSCLEROTIC HEART DISEASE OF NATIVE CORONARY ARTERY WITHOUT ANGINA PECTORIS: ICD-10-CM

## 2017-05-23 DIAGNOSIS — E11.9 TYPE 2 DIABETES MELLITUS WITHOUT COMPLICATIONS: ICD-10-CM

## 2017-05-23 DIAGNOSIS — R50.9 FEVER, UNSPECIFIED: ICD-10-CM

## 2017-05-23 DIAGNOSIS — Z99.2 DEPENDENCE ON RENAL DIALYSIS: ICD-10-CM

## 2017-05-23 DIAGNOSIS — I73.9 PERIPHERAL VASCULAR DISEASE, UNSPECIFIED: ICD-10-CM

## 2017-05-23 DIAGNOSIS — T82.868A THROMBOSIS DUE TO VASCULAR PROSTHETIC DEVICES, IMPLANTS AND GRAFTS, INITIAL ENCOUNTER: ICD-10-CM

## 2017-05-23 DIAGNOSIS — T82.7XXA INFECTION AND INFLAMMATORY REACTION DUE TO OTHER CARDIAC AND VASCULAR DEVICES, IMPLANTS AND GRAFTS, INITIAL ENCOUNTER: ICD-10-CM

## 2017-05-23 DIAGNOSIS — Z86.73 PERSONAL HISTORY OF TRANSIENT ISCHEMIC ATTACK (TIA), AND CEREBRAL INFARCTION WITHOUT RESIDUAL DEFICITS: ICD-10-CM

## 2017-05-23 DIAGNOSIS — Z51.5 ENCOUNTER FOR PALLIATIVE CARE: ICD-10-CM

## 2017-05-23 DIAGNOSIS — R51 HEADACHE: ICD-10-CM

## 2017-05-23 DIAGNOSIS — L03.114 CELLULITIS OF LEFT UPPER LIMB: ICD-10-CM

## 2017-05-23 LAB
CULTURE RESULTS: SIGNIFICANT CHANGE UP
SPECIMEN SOURCE: SIGNIFICANT CHANGE UP

## 2017-05-24 DIAGNOSIS — Z85.41 PERSONAL HISTORY OF MALIGNANT NEOPLASM OF CERVIX UTERI: ICD-10-CM

## 2017-05-24 DIAGNOSIS — E83.39 OTHER DISORDERS OF PHOSPHORUS METABOLISM: ICD-10-CM

## 2017-05-24 DIAGNOSIS — M54.81 OCCIPITAL NEURALGIA: ICD-10-CM

## 2017-05-24 DIAGNOSIS — G93.89 OTHER SPECIFIED DISORDERS OF BRAIN: ICD-10-CM

## 2023-01-19 NOTE — ED PROVIDER NOTE - SCRIBE NAME
Nacho Stack Tranexamic Acid Counseling:  Patient advised of the small risk of bleeding problems with tranexamic acid. They were also instructed to call if they developed any nausea, vomiting or diarrhea. All of the patient's questions and concerns were addressed.

## 2025-04-09 NOTE — ED ADULT NURSE NOTE - NS ED NURSE DISCH DISPOSITION
The patient is Stable - Low risk of patient condition declining or worsening    Shift Goals  Clinical Goals: Monitor neuro status, maintain skin integrity  Patient Goals: Sleep  Family Goals: soren    Progress made toward(s) clinical / shift goals:    Problem: Skin Integrity  Goal: Skin integrity is maintained or improved  Description: Target End Date:  Prior to discharge or change in level of careDocument interventions on Skin Risk/[place] flowsheet groups and corresponding LDA1.  Assess and monitor skin integrity, appearance and/or temperature2.  Assess risk factors for impaired skin integrity and/or pressures ulcers3.  Implement precautions to protect skin integrity in collaboration with interdisciplinary team4.  Implement pressure ulcer prevention protocol if at risk for skin breakdown5.  Confirm wound care consult if at risk for skin breakdown6.  Ensure patient use of pressure relieving devices  (Low air loss bed, waffle overlay, heel protectors, ROHO cushion, etc)  Outcome: Progressing  Note: Q2 turns implemented, tap system in place, PRANAV mattress on.     Problem: Fall Risk  Goal: Patient will remain free from falls  Description: Target End Date:  Prior to discharge or change in level of careDocument interventions on the [facility] [place] Fall Risk Assessment1.  Assess for fall risk factors2.  Implement fall precautions  Outcome: Progressing  Note: Fall precautions in place.        Patient is not progressing towards the following goals:       The patient's having decreased fluid from her Pl eurax drain.  Last week it was 200 cc's & this week it's only 50 cc's.  Please advise.   Admitted